# Patient Record
Sex: FEMALE | Race: WHITE | NOT HISPANIC OR LATINO | Employment: OTHER | URBAN - METROPOLITAN AREA
[De-identification: names, ages, dates, MRNs, and addresses within clinical notes are randomized per-mention and may not be internally consistent; named-entity substitution may affect disease eponyms.]

---

## 2017-09-15 ENCOUNTER — TRANSFERRED RECORDS (OUTPATIENT)
Dept: HEALTH INFORMATION MANAGEMENT | Facility: CLINIC | Age: 67
End: 2017-09-15

## 2017-09-15 ENCOUNTER — HOSPITAL ENCOUNTER (OUTPATIENT)
Dept: MAMMOGRAPHY | Facility: CLINIC | Age: 67
Discharge: HOME | End: 2017-09-15
Attending: PHYSICIAN ASSISTANT | Admitting: PHYSICIAN ASSISTANT
Payer: COMMERCIAL

## 2017-09-15 DIAGNOSIS — Z12.31 VISIT FOR SCREENING MAMMOGRAM: ICD-10-CM

## 2017-09-15 PROCEDURE — G0202 SCR MAMMO BI INCL CAD: HCPCS

## 2017-09-19 RX ORDER — PRENATAL VIT/IRON FUM/FOLIC AC 27MG-0.8MG
1 TABLET ORAL DAILY
Status: ON HOLD | COMMUNITY
End: 2019-07-02

## 2017-09-19 RX ORDER — FLUTICASONE PROPIONATE AND SALMETEROL XINAFOATE 45; 21 UG/1; UG/1
1 AEROSOL, METERED RESPIRATORY (INHALATION) DAILY PRN
Status: ON HOLD | COMMUNITY
End: 2019-07-02

## 2017-09-20 ENCOUNTER — APPOINTMENT (OUTPATIENT)
Dept: GENERAL RADIOLOGY | Facility: CLINIC | Age: 67
DRG: 470 | End: 2017-09-20
Attending: ORTHOPAEDIC SURGERY
Payer: COMMERCIAL

## 2017-09-20 ENCOUNTER — ANESTHESIA EVENT (OUTPATIENT)
Dept: SURGERY | Facility: CLINIC | Age: 67
DRG: 470 | End: 2017-09-20
Payer: COMMERCIAL

## 2017-09-20 ENCOUNTER — ANESTHESIA (OUTPATIENT)
Dept: SURGERY | Facility: CLINIC | Age: 67
DRG: 470 | End: 2017-09-20
Payer: COMMERCIAL

## 2017-09-20 ENCOUNTER — HOSPITAL ENCOUNTER (INPATIENT)
Facility: CLINIC | Age: 67
LOS: 3 days | Discharge: HOME | DRG: 470 | End: 2017-09-23
Attending: ORTHOPAEDIC SURGERY | Admitting: ORTHOPAEDIC SURGERY
Payer: COMMERCIAL

## 2017-09-20 DIAGNOSIS — Z96.641 H/O TOTAL HIP ARTHROPLASTY, RIGHT: Primary | ICD-10-CM

## 2017-09-20 LAB
ABO + RH BLD: NORMAL
ABO + RH BLD: NORMAL
BLD GP AB SCN SERPL QL: NORMAL
BLOOD BANK CMNT PATIENT-IMP: NORMAL
CREAT SERPL-MCNC: 0.88 MG/DL (ref 0.52–1.04)
ERYTHROCYTE [DISTWIDTH] IN BLOOD BY AUTOMATED COUNT: 13.8 % (ref 10–15)
GFR SERPL CREATININE-BSD FRML MDRD: 64 ML/MIN/1.7M2
HCT VFR BLD AUTO: 38.8 % (ref 35–47)
HGB BLD-MCNC: 13 G/DL (ref 11.7–15.7)
INR PPP: 0.91 (ref 0.86–1.14)
MCH RBC QN AUTO: 30.5 PG (ref 26.5–33)
MCHC RBC AUTO-ENTMCNC: 33.5 G/DL (ref 31.5–36.5)
MCV RBC AUTO: 91 FL (ref 78–100)
PLATELET # BLD AUTO: 264 10E9/L (ref 150–450)
POTASSIUM SERPL-SCNC: 4.2 MMOL/L (ref 3.4–5.3)
RBC # BLD AUTO: 4.26 10E12/L (ref 3.8–5.2)
SPECIMEN EXP DATE BLD: NORMAL
WBC # BLD AUTO: 5.1 10E9/L (ref 4–11)

## 2017-09-20 PROCEDURE — 82565 ASSAY OF CREATININE: CPT | Performed by: ORTHOPAEDIC SURGERY

## 2017-09-20 PROCEDURE — 27210794 ZZH OR GENERAL SUPPLY STERILE: Performed by: ORTHOPAEDIC SURGERY

## 2017-09-20 PROCEDURE — 27210995 ZZH RX 272: Performed by: ORTHOPAEDIC SURGERY

## 2017-09-20 PROCEDURE — 86900 BLOOD TYPING SEROLOGIC ABO: CPT | Performed by: ORTHOPAEDIC SURGERY

## 2017-09-20 PROCEDURE — 86901 BLOOD TYPING SEROLOGIC RH(D): CPT | Performed by: ORTHOPAEDIC SURGERY

## 2017-09-20 PROCEDURE — 25000128 H RX IP 250 OP 636: Performed by: NURSE ANESTHETIST, CERTIFIED REGISTERED

## 2017-09-20 PROCEDURE — 36000093 ZZH SURGERY LEVEL 4 1ST 30 MIN: Performed by: ORTHOPAEDIC SURGERY

## 2017-09-20 PROCEDURE — 25000125 ZZHC RX 250: Performed by: NURSE ANESTHETIST, CERTIFIED REGISTERED

## 2017-09-20 PROCEDURE — 84132 ASSAY OF SERUM POTASSIUM: CPT | Performed by: ORTHOPAEDIC SURGERY

## 2017-09-20 PROCEDURE — A9270 NON-COVERED ITEM OR SERVICE: HCPCS | Mod: GY | Performed by: ORTHOPAEDIC SURGERY

## 2017-09-20 PROCEDURE — 25000128 H RX IP 250 OP 636: Performed by: ORTHOPAEDIC SURGERY

## 2017-09-20 PROCEDURE — 37000008 ZZH ANESTHESIA TECHNICAL FEE, 1ST 30 MIN: Performed by: ORTHOPAEDIC SURGERY

## 2017-09-20 PROCEDURE — 85027 COMPLETE CBC AUTOMATED: CPT | Performed by: ORTHOPAEDIC SURGERY

## 2017-09-20 PROCEDURE — 71000014 ZZH RECOVERY PHASE 1 LEVEL 2 FIRST HR: Performed by: ORTHOPAEDIC SURGERY

## 2017-09-20 PROCEDURE — 0SR901A REPLACEMENT OF RIGHT HIP JOINT WITH METAL SYNTHETIC SUBSTITUTE, UNCEMENTED, OPEN APPROACH: ICD-10-PCS | Performed by: ORTHOPAEDIC SURGERY

## 2017-09-20 PROCEDURE — 71000015 ZZH RECOVERY PHASE 1 LEVEL 2 EA ADDTL HR: Performed by: ORTHOPAEDIC SURGERY

## 2017-09-20 PROCEDURE — 40000986 XR PELVIS PORT 1/2 VW

## 2017-09-20 PROCEDURE — S0020 INJECTION, BUPIVICAINE HYDRO: HCPCS | Performed by: ORTHOPAEDIC SURGERY

## 2017-09-20 PROCEDURE — 85610 PROTHROMBIN TIME: CPT | Performed by: ORTHOPAEDIC SURGERY

## 2017-09-20 PROCEDURE — 25000128 H RX IP 250 OP 636: Performed by: ANESTHESIOLOGY

## 2017-09-20 PROCEDURE — 25000566 ZZH SEVOFLURANE, EA 15 MIN: Performed by: ORTHOPAEDIC SURGERY

## 2017-09-20 PROCEDURE — 40000170 ZZH STATISTIC PRE-PROCEDURE ASSESSMENT II: Performed by: ORTHOPAEDIC SURGERY

## 2017-09-20 PROCEDURE — 36415 COLL VENOUS BLD VENIPUNCTURE: CPT | Performed by: ORTHOPAEDIC SURGERY

## 2017-09-20 PROCEDURE — C1713 ANCHOR/SCREW BN/BN,TIS/BN: HCPCS | Performed by: ORTHOPAEDIC SURGERY

## 2017-09-20 PROCEDURE — 25000125 ZZHC RX 250: Performed by: ORTHOPAEDIC SURGERY

## 2017-09-20 PROCEDURE — 25000132 ZZH RX MED GY IP 250 OP 250 PS 637: Mod: GY | Performed by: ORTHOPAEDIC SURGERY

## 2017-09-20 PROCEDURE — 37000009 ZZH ANESTHESIA TECHNICAL FEE, EACH ADDTL 15 MIN: Performed by: ORTHOPAEDIC SURGERY

## 2017-09-20 PROCEDURE — 25000125 ZZHC RX 250: Performed by: ANESTHESIOLOGY

## 2017-09-20 PROCEDURE — 86850 RBC ANTIBODY SCREEN: CPT | Performed by: ORTHOPAEDIC SURGERY

## 2017-09-20 PROCEDURE — 12000007 ZZH R&B INTERMEDIATE

## 2017-09-20 PROCEDURE — 36000063 ZZH SURGERY LEVEL 4 EA 15 ADDTL MIN: Performed by: ORTHOPAEDIC SURGERY

## 2017-09-20 PROCEDURE — C1776 JOINT DEVICE (IMPLANTABLE): HCPCS | Performed by: ORTHOPAEDIC SURGERY

## 2017-09-20 DEVICE — IMP SCR BONE CAN ACE 6.5X35MM 1217-35-500: Type: IMPLANTABLE DEVICE | Site: HIP | Status: FUNCTIONAL

## 2017-09-20 DEVICE — IMPLANTABLE DEVICE: Type: IMPLANTABLE DEVICE | Site: HIP | Status: FUNCTIONAL

## 2017-09-20 DEVICE — IMP CUP ACE PINNACLE 56MM 1217-22-056: Type: IMPLANTABLE DEVICE | Site: HIP | Status: FUNCTIONAL

## 2017-09-20 DEVICE — IMP LINER HIP DEPUY PINNACLE ALTRX 36X56MM +4 1221-36-456: Type: IMPLANTABLE DEVICE | Site: HIP | Status: FUNCTIONAL

## 2017-09-20 DEVICE — IMP HEAD FEMORAL DEPUY 36MM +1.5 1365-51-000: Type: IMPLANTABLE DEVICE | Site: HIP | Status: FUNCTIONAL

## 2017-09-20 DEVICE — IMP SCR BONE CAN ACE 6.5X30MM 1217-30-500: Type: IMPLANTABLE DEVICE | Site: HIP | Status: FUNCTIONAL

## 2017-09-20 RX ORDER — LIDOCAINE 40 MG/G
CREAM TOPICAL
Status: DISCONTINUED | OUTPATIENT
Start: 2017-09-20 | End: 2017-09-23 | Stop reason: HOSPADM

## 2017-09-20 RX ORDER — ACETAMINOPHEN 325 MG/1
650 TABLET ORAL EVERY 4 HOURS PRN
Status: DISCONTINUED | OUTPATIENT
Start: 2017-09-23 | End: 2017-09-23 | Stop reason: HOSPADM

## 2017-09-20 RX ORDER — EPHEDRINE SULFATE 50 MG/ML
INJECTION, SOLUTION INTRAMUSCULAR; INTRAVENOUS; SUBCUTANEOUS PRN
Status: DISCONTINUED | OUTPATIENT
Start: 2017-09-20 | End: 2017-09-20

## 2017-09-20 RX ORDER — BUPIVACAINE HYDROCHLORIDE AND EPINEPHRINE 5; 5 MG/ML; UG/ML
INJECTION, SOLUTION PERINEURAL PRN
Status: DISCONTINUED | OUTPATIENT
Start: 2017-09-20 | End: 2017-09-20 | Stop reason: HOSPADM

## 2017-09-20 RX ORDER — HYDROCHLOROTHIAZIDE 12.5 MG/1
12.5 CAPSULE ORAL DAILY PRN
Status: DISCONTINUED | OUTPATIENT
Start: 2017-09-20 | End: 2017-09-23 | Stop reason: HOSPADM

## 2017-09-20 RX ORDER — VANCOMYCIN HYDROCHLORIDE 1 G/200ML
1000 INJECTION, SOLUTION INTRAVENOUS
Status: COMPLETED | OUTPATIENT
Start: 2017-09-20 | End: 2017-09-20

## 2017-09-20 RX ORDER — PRENATAL VIT/IRON FUM/FOLIC AC 27MG-0.8MG
1 TABLET ORAL DAILY
Status: DISCONTINUED | OUTPATIENT
Start: 2017-09-20 | End: 2017-09-23 | Stop reason: HOSPADM

## 2017-09-20 RX ORDER — ACETAMINOPHEN 325 MG/1
975 TABLET ORAL EVERY 8 HOURS
Status: DISCONTINUED | OUTPATIENT
Start: 2017-09-20 | End: 2017-09-23 | Stop reason: HOSPADM

## 2017-09-20 RX ORDER — OXYCODONE HYDROCHLORIDE 5 MG/1
5-10 TABLET ORAL EVERY 4 HOURS PRN
Status: DISCONTINUED | OUTPATIENT
Start: 2017-09-20 | End: 2017-09-23 | Stop reason: HOSPADM

## 2017-09-20 RX ORDER — CEFAZOLIN SODIUM 2 G/100ML
2 INJECTION, SOLUTION INTRAVENOUS
Status: DISCONTINUED | OUTPATIENT
Start: 2017-09-20 | End: 2017-09-20 | Stop reason: ALTCHOICE

## 2017-09-20 RX ORDER — FERROUS GLUCONATE 324(38)MG
324 TABLET ORAL DAILY
Status: DISCONTINUED | OUTPATIENT
Start: 2017-09-20 | End: 2017-09-23 | Stop reason: HOSPADM

## 2017-09-20 RX ORDER — GLYCOPYRROLATE 0.2 MG/ML
INJECTION, SOLUTION INTRAMUSCULAR; INTRAVENOUS PRN
Status: DISCONTINUED | OUTPATIENT
Start: 2017-09-20 | End: 2017-09-20

## 2017-09-20 RX ORDER — LABETALOL HYDROCHLORIDE 5 MG/ML
10 INJECTION, SOLUTION INTRAVENOUS ONCE
Status: COMPLETED | OUTPATIENT
Start: 2017-09-20 | End: 2017-09-20

## 2017-09-20 RX ORDER — SODIUM CHLORIDE 9 MG/ML
INJECTION, SOLUTION INTRAVENOUS CONTINUOUS
Status: DISCONTINUED | OUTPATIENT
Start: 2017-09-20 | End: 2017-09-22 | Stop reason: CLARIF

## 2017-09-20 RX ORDER — OXYCODONE HCL 10 MG/1
10 TABLET, FILM COATED, EXTENDED RELEASE ORAL ONCE
Status: COMPLETED | OUTPATIENT
Start: 2017-09-20 | End: 2017-09-20

## 2017-09-20 RX ORDER — KETOROLAC TROMETHAMINE 15 MG/ML
15 INJECTION, SOLUTION INTRAMUSCULAR; INTRAVENOUS EVERY 6 HOURS PRN
Status: COMPLETED | OUTPATIENT
Start: 2017-09-20 | End: 2017-09-21

## 2017-09-20 RX ORDER — PROCHLORPERAZINE MALEATE 5 MG
5 TABLET ORAL EVERY 6 HOURS PRN
Status: DISCONTINUED | OUTPATIENT
Start: 2017-09-20 | End: 2017-09-23 | Stop reason: HOSPADM

## 2017-09-20 RX ORDER — NEOSTIGMINE METHYLSULFATE 1 MG/ML
VIAL (ML) INJECTION PRN
Status: DISCONTINUED | OUTPATIENT
Start: 2017-09-20 | End: 2017-09-20

## 2017-09-20 RX ORDER — HYDROXYZINE HYDROCHLORIDE 10 MG/1
10 TABLET, FILM COATED ORAL EVERY 6 HOURS PRN
Status: DISCONTINUED | OUTPATIENT
Start: 2017-09-20 | End: 2017-09-23 | Stop reason: HOSPADM

## 2017-09-20 RX ORDER — HYDRALAZINE HYDROCHLORIDE 20 MG/ML
10 INJECTION INTRAMUSCULAR; INTRAVENOUS ONCE
Status: COMPLETED | OUTPATIENT
Start: 2017-09-20 | End: 2017-09-20

## 2017-09-20 RX ORDER — LABETALOL HYDROCHLORIDE 5 MG/ML
INJECTION, SOLUTION INTRAVENOUS PRN
Status: DISCONTINUED | OUTPATIENT
Start: 2017-09-20 | End: 2017-09-20

## 2017-09-20 RX ORDER — ONDANSETRON 2 MG/ML
4 INJECTION INTRAMUSCULAR; INTRAVENOUS EVERY 30 MIN PRN
Status: DISCONTINUED | OUTPATIENT
Start: 2017-09-20 | End: 2017-09-20 | Stop reason: HOSPADM

## 2017-09-20 RX ORDER — LEVOTHYROXINE SODIUM 100 UG/1
100 TABLET ORAL DAILY
Status: DISCONTINUED | OUTPATIENT
Start: 2017-09-21 | End: 2017-09-23 | Stop reason: HOSPADM

## 2017-09-20 RX ORDER — FENTANYL CITRATE 50 UG/ML
25-50 INJECTION, SOLUTION INTRAMUSCULAR; INTRAVENOUS
Status: DISCONTINUED | OUTPATIENT
Start: 2017-09-20 | End: 2017-09-20 | Stop reason: HOSPADM

## 2017-09-20 RX ORDER — PROPOFOL 10 MG/ML
INJECTION, EMULSION INTRAVENOUS PRN
Status: DISCONTINUED | OUTPATIENT
Start: 2017-09-20 | End: 2017-09-20

## 2017-09-20 RX ORDER — FENTANYL CITRATE 50 UG/ML
INJECTION, SOLUTION INTRAMUSCULAR; INTRAVENOUS PRN
Status: DISCONTINUED | OUTPATIENT
Start: 2017-09-20 | End: 2017-09-20

## 2017-09-20 RX ORDER — CEFAZOLIN SODIUM 1 G/3ML
1 INJECTION, POWDER, FOR SOLUTION INTRAMUSCULAR; INTRAVENOUS SEE ADMIN INSTRUCTIONS
Status: DISCONTINUED | OUTPATIENT
Start: 2017-09-20 | End: 2017-09-20 | Stop reason: ALTCHOICE

## 2017-09-20 RX ORDER — SODIUM CHLORIDE, SODIUM LACTATE, POTASSIUM CHLORIDE, CALCIUM CHLORIDE 600; 310; 30; 20 MG/100ML; MG/100ML; MG/100ML; MG/100ML
INJECTION, SOLUTION INTRAVENOUS CONTINUOUS
Status: DISCONTINUED | OUTPATIENT
Start: 2017-09-20 | End: 2017-09-20 | Stop reason: HOSPADM

## 2017-09-20 RX ORDER — HYDROMORPHONE HYDROCHLORIDE 1 MG/ML
.3-.5 INJECTION, SOLUTION INTRAMUSCULAR; INTRAVENOUS; SUBCUTANEOUS
Status: DISCONTINUED | OUTPATIENT
Start: 2017-09-20 | End: 2017-09-23 | Stop reason: HOSPADM

## 2017-09-20 RX ORDER — LIDOCAINE HYDROCHLORIDE 20 MG/ML
INJECTION, SOLUTION INFILTRATION; PERINEURAL PRN
Status: DISCONTINUED | OUTPATIENT
Start: 2017-09-20 | End: 2017-09-20

## 2017-09-20 RX ORDER — ALBUTEROL SULFATE 90 UG/1
1-2 AEROSOL, METERED RESPIRATORY (INHALATION) EVERY 4 HOURS PRN
Status: DISCONTINUED | OUTPATIENT
Start: 2017-09-20 | End: 2017-09-23 | Stop reason: HOSPADM

## 2017-09-20 RX ORDER — VANCOMYCIN HYDROCHLORIDE 1 G/200ML
1000 INJECTION, SOLUTION INTRAVENOUS SEE ADMIN INSTRUCTIONS
Status: DISCONTINUED | OUTPATIENT
Start: 2017-09-20 | End: 2017-09-20 | Stop reason: HOSPADM

## 2017-09-20 RX ORDER — VANCOMYCIN HYDROCHLORIDE 1 G/20ML
INJECTION, POWDER, LYOPHILIZED, FOR SOLUTION INTRAVENOUS PRN
Status: DISCONTINUED | OUTPATIENT
Start: 2017-09-20 | End: 2017-09-20 | Stop reason: HOSPADM

## 2017-09-20 RX ORDER — ONDANSETRON 4 MG/1
4 TABLET, ORALLY DISINTEGRATING ORAL EVERY 6 HOURS PRN
Status: DISCONTINUED | OUTPATIENT
Start: 2017-09-20 | End: 2017-09-23 | Stop reason: HOSPADM

## 2017-09-20 RX ORDER — HYDROMORPHONE HYDROCHLORIDE 1 MG/ML
.3-.5 INJECTION, SOLUTION INTRAMUSCULAR; INTRAVENOUS; SUBCUTANEOUS EVERY 5 MIN PRN
Status: DISCONTINUED | OUTPATIENT
Start: 2017-09-20 | End: 2017-09-20 | Stop reason: HOSPADM

## 2017-09-20 RX ORDER — ONDANSETRON 2 MG/ML
INJECTION INTRAMUSCULAR; INTRAVENOUS PRN
Status: DISCONTINUED | OUTPATIENT
Start: 2017-09-20 | End: 2017-09-20

## 2017-09-20 RX ORDER — ONDANSETRON 4 MG/1
4 TABLET, ORALLY DISINTEGRATING ORAL EVERY 30 MIN PRN
Status: DISCONTINUED | OUTPATIENT
Start: 2017-09-20 | End: 2017-09-20 | Stop reason: HOSPADM

## 2017-09-20 RX ORDER — BUPIVACAINE HYDROCHLORIDE 5 MG/ML
INJECTION, SOLUTION PERINEURAL PRN
Status: DISCONTINUED | OUTPATIENT
Start: 2017-09-20 | End: 2017-09-20 | Stop reason: HOSPADM

## 2017-09-20 RX ORDER — AMOXICILLIN 250 MG
1-2 CAPSULE ORAL 2 TIMES DAILY
Status: DISCONTINUED | OUTPATIENT
Start: 2017-09-20 | End: 2017-09-23 | Stop reason: HOSPADM

## 2017-09-20 RX ORDER — ONDANSETRON 2 MG/ML
4 INJECTION INTRAMUSCULAR; INTRAVENOUS EVERY 6 HOURS PRN
Status: DISCONTINUED | OUTPATIENT
Start: 2017-09-20 | End: 2017-09-23 | Stop reason: HOSPADM

## 2017-09-20 RX ORDER — LISINOPRIL 40 MG/1
40 TABLET ORAL DAILY
Status: DISCONTINUED | OUTPATIENT
Start: 2017-09-21 | End: 2017-09-23 | Stop reason: HOSPADM

## 2017-09-20 RX ORDER — NALOXONE HYDROCHLORIDE 0.4 MG/ML
.1-.4 INJECTION, SOLUTION INTRAMUSCULAR; INTRAVENOUS; SUBCUTANEOUS
Status: DISCONTINUED | OUTPATIENT
Start: 2017-09-20 | End: 2017-09-23 | Stop reason: HOSPADM

## 2017-09-20 RX ORDER — CYCLOBENZAPRINE HCL 5 MG
5 TABLET ORAL 3 TIMES DAILY PRN
Status: DISCONTINUED | OUTPATIENT
Start: 2017-09-20 | End: 2017-09-23 | Stop reason: HOSPADM

## 2017-09-20 RX ADMIN — LIDOCAINE HYDROCHLORIDE 0.2 ML: 10 INJECTION, SOLUTION EPIDURAL; INFILTRATION; INTRACAUDAL; PERINEURAL at 08:35

## 2017-09-20 RX ADMIN — PROPOFOL 200 MG: 10 INJECTION, EMULSION INTRAVENOUS at 10:54

## 2017-09-20 RX ADMIN — LABETALOL HYDROCHLORIDE 10 MG: 5 INJECTION, SOLUTION INTRAVENOUS at 13:27

## 2017-09-20 RX ADMIN — KETOROLAC TROMETHAMINE 15 MG: 15 INJECTION, SOLUTION INTRAMUSCULAR; INTRAVENOUS at 23:49

## 2017-09-20 RX ADMIN — ONDANSETRON 4 MG: 2 INJECTION INTRAMUSCULAR; INTRAVENOUS at 12:29

## 2017-09-20 RX ADMIN — KETOROLAC TROMETHAMINE 15 MG: 15 INJECTION, SOLUTION INTRAMUSCULAR; INTRAVENOUS at 16:32

## 2017-09-20 RX ADMIN — LIDOCAINE HYDROCHLORIDE 100 MG: 20 INJECTION, SOLUTION INFILTRATION; PERINEURAL at 10:54

## 2017-09-20 RX ADMIN — SODIUM CHLORIDE, POTASSIUM CHLORIDE, SODIUM LACTATE AND CALCIUM CHLORIDE: 600; 310; 30; 20 INJECTION, SOLUTION INTRAVENOUS at 11:45

## 2017-09-20 RX ADMIN — NEOSTIGMINE METHYLSULFATE 4 MG: 1 INJECTION INTRAMUSCULAR; INTRAVENOUS; SUBCUTANEOUS at 12:32

## 2017-09-20 RX ADMIN — PROCHLORPERAZINE EDISYLATE 5 MG: 5 INJECTION INTRAMUSCULAR; INTRAVENOUS at 21:04

## 2017-09-20 RX ADMIN — HYDROMORPHONE HYDROCHLORIDE 0.5 MG: 1 INJECTION, SOLUTION INTRAMUSCULAR; INTRAVENOUS; SUBCUTANEOUS at 11:33

## 2017-09-20 RX ADMIN — HYDRALAZINE HYDROCHLORIDE 10 MG: 20 INJECTION INTRAMUSCULAR; INTRAVENOUS at 14:29

## 2017-09-20 RX ADMIN — FENTANYL CITRATE 50 MCG: 50 INJECTION, SOLUTION INTRAMUSCULAR; INTRAVENOUS at 13:31

## 2017-09-20 RX ADMIN — Medication 5 MG: at 12:03

## 2017-09-20 RX ADMIN — SODIUM CHLORIDE, POTASSIUM CHLORIDE, SODIUM LACTATE AND CALCIUM CHLORIDE: 600; 310; 30; 20 INJECTION, SOLUTION INTRAVENOUS at 12:37

## 2017-09-20 RX ADMIN — PROCHLORPERAZINE EDISYLATE 5 MG: 5 INJECTION INTRAMUSCULAR; INTRAVENOUS at 15:23

## 2017-09-20 RX ADMIN — Medication 10 MG: at 12:12

## 2017-09-20 RX ADMIN — HYDRALAZINE HYDROCHLORIDE 10 MG: 20 INJECTION INTRAMUSCULAR; INTRAVENOUS at 14:02

## 2017-09-20 RX ADMIN — ROCURONIUM BROMIDE 50 MG: 10 INJECTION INTRAVENOUS at 10:54

## 2017-09-20 RX ADMIN — ONDANSETRON 4 MG: 2 SOLUTION INTRAMUSCULAR; INTRAVENOUS at 16:58

## 2017-09-20 RX ADMIN — SODIUM CHLORIDE, POTASSIUM CHLORIDE, SODIUM LACTATE AND CALCIUM CHLORIDE: 600; 310; 30; 20 INJECTION, SOLUTION INTRAVENOUS at 08:35

## 2017-09-20 RX ADMIN — ONDANSETRON 4 MG: 2 SOLUTION INTRAMUSCULAR; INTRAVENOUS at 13:46

## 2017-09-20 RX ADMIN — VANCOMYCIN HYDROCHLORIDE 1500 MG: 5 INJECTION, POWDER, LYOPHILIZED, FOR SOLUTION INTRAVENOUS at 21:51

## 2017-09-20 RX ADMIN — HYDROMORPHONE HYDROCHLORIDE 0.5 MG: 1 INJECTION, SOLUTION INTRAMUSCULAR; INTRAVENOUS; SUBCUTANEOUS at 14:09

## 2017-09-20 RX ADMIN — Medication 5 MG: at 12:17

## 2017-09-20 RX ADMIN — SODIUM CHLORIDE: 9 INJECTION, SOLUTION INTRAVENOUS at 16:32

## 2017-09-20 RX ADMIN — VANCOMYCIN HYDROCHLORIDE 1000 MG: 1 INJECTION, SOLUTION INTRAVENOUS at 10:37

## 2017-09-20 RX ADMIN — MIDAZOLAM HYDROCHLORIDE 2 MG: 1 INJECTION, SOLUTION INTRAMUSCULAR; INTRAVENOUS at 10:45

## 2017-09-20 RX ADMIN — ONDANSETRON 4 MG: 2 SOLUTION INTRAMUSCULAR; INTRAVENOUS at 23:46

## 2017-09-20 RX ADMIN — Medication 5 MG: at 12:15

## 2017-09-20 RX ADMIN — HYDROMORPHONE HYDROCHLORIDE 1 MG: 1 INJECTION, SOLUTION INTRAMUSCULAR; INTRAVENOUS; SUBCUTANEOUS at 11:02

## 2017-09-20 RX ADMIN — HYDROMORPHONE HYDROCHLORIDE 0.5 MG: 1 INJECTION, SOLUTION INTRAMUSCULAR; INTRAVENOUS; SUBCUTANEOUS at 13:14

## 2017-09-20 RX ADMIN — Medication 10 MG: at 11:42

## 2017-09-20 RX ADMIN — OXYCODONE HYDROCHLORIDE 10 MG: 10 TABLET, FILM COATED, EXTENDED RELEASE ORAL at 08:18

## 2017-09-20 RX ADMIN — FENTANYL CITRATE 100 MCG: 50 INJECTION, SOLUTION INTRAMUSCULAR; INTRAVENOUS at 10:54

## 2017-09-20 RX ADMIN — GLYCOPYRROLATE 0.6 MG: 0.2 INJECTION, SOLUTION INTRAMUSCULAR; INTRAVENOUS at 12:32

## 2017-09-20 RX ADMIN — LABETALOL HYDROCHLORIDE 10 MG: 5 INJECTION, SOLUTION INTRAVENOUS at 11:06

## 2017-09-20 ASSESSMENT — LIFESTYLE VARIABLES: TOBACCO_USE: 1

## 2017-09-20 NOTE — PROVIDER NOTIFICATION
No change to BP after 10mg hydralazine.  Dr Jesus ordered another 10mg Hydalazine to be given.  Will continue to monitor.

## 2017-09-20 NOTE — ANESTHESIA CARE TRANSFER NOTE
Patient: Jayshree Hastings    Procedure(s):  RIGHT TOTAL HIP ARTHROPLASTY  - Wound Class: I-Clean    Diagnosis: djd  Diagnosis Additional Information: No value filed.    Anesthesia Type:   General, ETT     Note:  Airway :Face Mask  Patient transferred to:PACU  Comments: Patient breathing spontaneously.  Follows commands.  Suctioned and extubated.  Exchanging air well.  Transferred to PACU with 10L O2 via mask.  Monitors on.  VSS.  Patent IV.  Report and transfer of care to RN.        Vitals: (Last set prior to Anesthesia Care Transfer)    CRNA VITALS  9/20/2017 1216 - 9/20/2017 1254      9/20/2017             Pulse: 111    SpO2: 98 %    Resp Rate (observed): 12                Electronically Signed By: MAURICE Terrell CRNA  September 20, 2017  12:54 PM

## 2017-09-20 NOTE — ANESTHESIA POSTPROCEDURE EVALUATION
Patient: Jayshree Hastings    Procedure(s):  RIGHT TOTAL HIP ARTHROPLASTY  - Wound Class: I-Clean    Diagnosis:djd  Diagnosis Additional Information: No value filed.    Anesthesia Type:  General, ETT    Note:  Anesthesia Post Evaluation    Patient location during evaluation: PACU  Patient participation: Able to fully participate in evaluation  Level of consciousness: awake and alert  Pain management: adequate  Airway patency: patent  Cardiovascular status: acceptable  Respiratory status: acceptable  Hydration status: acceptable  PONV: none and controlled     Anesthetic complications: None          Last vitals:  Vitals:    09/20/17 1528 09/20/17 1530 09/20/17 1550   BP:   152/90   Resp:  11 12   Temp:   36.4  C (97.5  F)   SpO2: 97% 97% 93%         Electronically Signed By: Alexander Jesus MD  September 20, 2017  4:11 PM

## 2017-09-20 NOTE — PROVIDER NOTIFICATION
Dr Jesus notified regarding PACU BP's ranging from 170-190's/100's.  Labile pressure in OR.  Verbal order for 10mg labetalol. Will continue to monitor.

## 2017-09-20 NOTE — IP AVS SNAPSHOT
[address] [facility]    [address] [place] [address]    Phone:  [phone]                                       After Visit Summary   9/20/2017    Jayshree Hastings    MRN: [id number]           After Visit Summary Signature Page     I have received my discharge instructions, and my questions have been answered. I have discussed any challenges I see with this plan with the nurse or doctor.    ..........................................................................................................................................  Patient/Patient Representative Signature      ..........................................................................................................................................  Patient Representative Print Name and Relationship to Patient    ..................................................               ................................................  Date                                            Time    ..........................................................................................................................................  Reviewed by Signature/Title    ...................................................              ..............................................  Date                                                            Time

## 2017-09-20 NOTE — ANESTHESIA PREPROCEDURE EVALUATION
Procedure: Procedure(s):  ARTHROPLASTY HIP  Preop diagnosis: djd    No Known Allergies  Past Medical History:   Diagnosis Date     Allergic rhinitis      Asthma, mild intermittent      Hypertension      Hypothyroid      Past Surgical History:   Procedure Laterality Date     wisdom teeth       Prior to Admission medications    Medication Sig Start Date End Date Taking? Authorizing Provider   IBUPROFEN PO Take 200 mg by mouth daily as needed for moderate pain   Yes Reported, Patient   ASPIRIN PO Take 81 mg by mouth daily   Yes Reported, Patient   Albuterol Sulfate (PROAIR HFA IN) Inhale 1-2 puffs into the lungs every 4 hours as needed (chest tightness/wheezing)   Yes Reported, Patient   HYDROCHLOROTHIAZIDE PO Take 12.5 mg by mouth daily as needed    Yes Reported, Patient   LISINOPRIL PO Take 40 mg by mouth daily   Yes Reported, Patient   LEVOTHYROXINE SODIUM PO Take 100 mcg by mouth daily   Yes Reported, Patient   fluticasone-salmeterol (ADVAIR HFA) 45-21 MCG/ACT inhaler Inhale 1 puff into the lungs daily as needed    Yes Reported, Patient   Prenatal Vit-Fe Fumarate-FA (PRENATAL MULTIVITAMIN PLUS IRON) 27-0.8 MG TABS per tablet Take 1 tablet by mouth daily   Yes Reported, Patient   VITAMIN D, CHOLECALCIFEROL, PO Take 1,000 Units by mouth daily   Yes Reported, Patient     Current Facility-Administered Medications Ordered in Epic   Medication Dose Route Frequency Last Rate Last Dose     ceFAZolin sodium-dextrose (ANCEF) infusion 2 g  2 g Intravenous Pre-Op/Pre-procedure x 1 dose         ceFAZolin (ANCEF) 1 g vial to attach to  ml bag for ADULT or 50 ml bag for PEDS  1 g Intravenous See Admin Instructions         vancomycin (VANCOCIN) 1000 mg in dextrose 5% 200 mL PREMIX  1,000 mg Intravenous Pre-Op/Pre-procedure x 1 dose         vancomycin (VANCOCIN) 1000 mg in dextrose 5% 200 mL PREMIX  1,000 mg Intravenous See Admin Instructions         tranexamic acid (CYKLOKAPRON) 2 g in NaCl 0.9 % 30 mL intra-articular  solution  2 g INTRA-ARTICULAR Once         lidocaine 1 % 1 mL  1 mL Other Q1H PRN   0.2 mL at 09/20/17 0835     lactated ringers infusion   Intravenous Continuous 25 mL/hr at 09/20/17 0835       No current Epic-ordered outpatient prescriptions on file.     Wt Readings from Last 1 Encounters:   09/20/17 86.2 kg (190 lb)     Temp Readings from Last 1 Encounters:   09/20/17 36.1  C (97  F) (Temporal)     BP Readings from Last 6 Encounters:   09/20/17 144/86     Pulse Readings from Last 4 Encounters:   No data found for Pulse     Resp Readings from Last 1 Encounters:   09/20/17 16     SpO2 Readings from Last 1 Encounters:   09/20/17 97%     No results for input(s): NA, POTASSIUM, CHLORIDE, CO2, ANIONGAP, GLC, BUN, CR, CE in the last 46204 hours.  No results for input(s): AST, ALT in the last 30275 hours.    Invalid input(s): ALP, BILT, LPSE  Recent Labs   Lab Test  09/20/17   0830   WBC  5.1   HGB  13.0   PLT  264     No results for input(s): INR in the last 26379 hours.    Invalid input(s): APTT   No results for input(s): TROPI in the last 58402 hours.  RECENT LABS:   ECG:   ECHO:   CXR:    Anesthesia Evaluation     . Pt has not had prior anesthetic            ROS/MED HX    ENT/Pulmonary:     (+)tobacco use, Current use 1/4 packs/day  Intermittent asthma Treatment: Inhaler prn,  , . .   (-) sleep apnea   Neurologic:       Cardiovascular:     (+) hypertension----. : . . . :. .       METS/Exercise Tolerance:     Hematologic:         Musculoskeletal:   (+) arthritis, , , -       GI/Hepatic:         Renal/Genitourinary:         Endo:     (+) thyroid problem .      Psychiatric:         Infectious Disease:         Malignancy:         Other:                     Physical Exam  Normal systems: cardiovascular and pulmonary    Airway   Mallampati: I  Neck ROM: full    Dental   (+) caps and missing    Cardiovascular       Pulmonary                     Anesthesia Plan      History & Physical Review  History and physical reviewed  and following examination; no interval change.    ASA Status:  2 .    NPO Status:  > 8 hours    Plan for General and ETT with Intravenous induction. Maintenance will be Balanced.    PONV prophylaxis:  Ondansetron (or other 5HT-3)  Additional equipment: Videolaryngoscope      Postoperative Care  Postoperative pain management:  IV analgesics.      Consents  Anesthetic plan, risks, benefits and alternatives discussed with:  Patient and Spouse..                          .

## 2017-09-20 NOTE — IP AVS SNAPSHOT
MRN:[id number]                      After Visit Summary   9/20/2017    Jayshree Hastings    MRN: [id number]           Thank you!     Thank you for choosing [facility] for your care. Our goal is always to provide you with excellent care. Hearing back from our patients is one way we can continue to improve our services. Please take a few minutes to complete the written survey that you may receive in the mail after you visit with us. Thank you!        Patient Information     Date Of Birth          1950        Designated Caregiver       Most Recent Value    Caregiver    Will someone help with your care after discharge? no [pt primary-  help prn]      About your hospital stay     You were admitted on:  September 20, 2017 You last received care in the:  [place] [facility] [address] [facility]    You were discharged on:  September 23, 2017        Reason for your hospital stay       darell                  Who to Call     For medical emergencies, please call 911.  For non-urgent questions about your medical care, please call your primary care provider or clinic, [phone]  For questions related to your surgery, please call your surgery clinic        Attending Provider     Provider Specialty    Chacho Cates MD Surgery       Primary Care Provider Office Phone # Fax #    Kisha Diego PA-C [phone]      After Care Instructions     Diet       Follow this diet upon discharge: Regular                  Follow-up Appointments     Follow-up and recommended labs and tests        Follow up with Chacho vaca,16  Days  Post op                  Further instructions from your care team       TOTAL HIP REPLACEMENT TAKE HOME INSTRUCTIONS  Your surgeon will answer any questions about your progress. General guidelines for your care are listed below. Your surgeon may give additional instructions for your care at home. Please follow them carefully.    Activity Level  1. Physical  "activity may be resumed gradually according to your comfort level and your surgeon s instructions. Follow your exercise program as instructed by your therapist. Do exercises at least twice daily. Refer to pages 19-22 of your \"Total Hip Replacement \" booklet for details.  2. Complete exercises two hours before bedtime to minimize the effect pain may have on sleep.  3. Do not cross legs. Do not bend past 90 .    Good Health Practices  1. Maintain an adequate fluid intake and eat a well balanced diet.  2. Be sure to include the basic food groups, such as dairy products, meat/fish, vegetables, and fruit. Each of these foods contribute to wound healing and increasing your strength.  3. Surgery, decreased activity and pain medication all contribute to a descrease in bowel activity that can result in constipation. It is recommended that you increase your liquid intake, add fiber to your diet, increase activity, and decrease pain medication use. If you have any problems, notify your physician.  4. Notify your dentist of your total hip surgery and call your dentist one week before a dental appointment for antibiotics.  If dentist will not prescribe antibiotics call your surgeon to ask on next steps.      Incision/Dressing Care  1. Keep incision clean and dry.  2. Cover incision if you are still having drainage.  3.  If you have a waterproof dressing _____________________   Shower.    Things to Watch For  1. Check incision daily for increased redness, tenderness, swelling, or drainage along the incision line. If these occur, please notify your doctor. Also, call if you develop a fever above 101 .  2. Please notify your doctor if you experience any calf pain and/or if you have surgical pain not relieved by the pain medication prescribed by your doctor.              Pending Results     No orders found from 9/18/2017 to 9/21/2017.            Statement of Approval     Ordered          09/23/17 0704  I have reviewed and agree with " "all the recommendations and orders detailed in this document.  EFFECTIVE NOW     Approved and electronically signed by:  Chacho Cates MD             Admission Information     Date & Time Provider Department Dept. Phone    2017 Chacho Cates MD [place] [address] [facility] [phone]      Your Vitals Were     Blood Pressure Pulse Temperature Respirations Height Weight    111/67 (BP Location: Right arm) 78 98.1  F (36.7  C) (Oral) 16 1.626 m (5' 4\") 86.2 kg (190 lb)    Pulse Oximetry BMI (Body Mass Index)                93% 32.61 kg/m2          MyChart Information     [employer] lets you send messages to your doctor, view your test results, renew your prescriptions, schedule appointments and more. To sign up, go to [email or web address] . Click on \"Log in\" on the left side of the screen, which will take you to the Welcome page. Then click on \"Sign up Now\" on the right side of the page.     You will be asked to enter the access code listed below, as well as some personal information. Please follow the directions to create your username and password.     Your access code is: [email or web address]  Expires: 2017  9:24 AM     Your access code will  in 90 days. If you need help or a new code, please call your [facility] clinic or [phone].        Care EveryWhere ID     This is your Care EveryWhere ID. This could be used by other organizations to access your [facility] medical records  [id number]        Equal Access to Services     ANA BEST : Hadii julian cao Soeda, waaxda luqadaha, qaybta kaalmada ja, lakesha santa. So [facility] [phone].    ATENCIÓN: Si habla español, tiene a lopez disposición servicios gratuitos de asistencia lingüística. Llame al [phone].    We comply with applicable federal civil rights laws and [place] laws. We do not discriminate on the basis of race, color, national origin, age, disability sex, sexual orientation or " gender identity.               Review of your medicines      START taking        Dose / Directions    acetaminophen 325 MG tablet   Commonly known as:  TYLENOL        Dose:  975 mg   Take 3 tablets (975 mg) by mouth every 8 hours   Quantity:  100 tablet   Refills:  0       enoxaparin 40 MG/0.4ML injection   Commonly known as:  LOVENOX        Dose:  40 mg   Inject 0.4 mLs (40 mg) Subcutaneous every 24 hours   Quantity:  6 Syringe   Refills:  0       ferrous gluconate 324 (38 FE) MG tablet   Commonly known as:  FERGON        Dose:  324 mg   Take 1 tablet (324 mg) by mouth daily   Quantity:  60 tablet   Refills:  0       oxyCODONE 5 MG IR tablet   Commonly known as:  ROXICODONE        Dose:  5 mg   Take 1 tablet (5 mg) by mouth every 6 hours as needed for moderate to severe pain   Quantity:  60 tablet   Refills:  0       senna-docusate 8.6-50 MG per tablet   Commonly known as:  SENOKOT-S;PERICOLACE        Dose:  1-2 tablet   Take 1-2 tablets by mouth 2 times daily   Quantity:  60 tablet   Refills:  0         CONTINUE these medicines which may have CHANGED, or have new prescriptions. If we are uncertain of the size of tablets/capsules you have at home, strength may be listed as something that might have changed.        Dose / Directions    aspirin 325 MG tablet   This may have changed:    - medication strength  - how much to take  - how to take this  - when to take this  - additional instructions        Start taking on:  9/29/2017   325 mg  Bid with food  For 1  Week then  325 mg/day  For  2 more  Weeks  With food   Quantity:  90 tablet   Refills:  0         CONTINUE these medicines which have NOT CHANGED        Dose / Directions    ADVAIR HFA 45-21 MCG/ACT inhaler   Generic drug:  fluticasone-salmeterol        Dose:  1 puff   Inhale 1 puff into the lungs daily as needed   Refills:  0       HYDROCHLOROTHIAZIDE PO        Dose:  12.5 mg   Take 12.5 mg by mouth daily as needed   Refills:  0       IBUPROFEN PO        Dose:   200 mg   Take 200 mg by mouth daily as needed for moderate pain   Refills:  0       LEVOTHYROXINE SODIUM PO        Dose:  100 mcg   Take 100 mcg by mouth daily   Refills:  0       LISINOPRIL PO        Dose:  40 mg   Take 40 mg by mouth daily   Refills:  0       prenatal multivitamin plus iron 27-0.8 MG Tabs per tablet        Dose:  1 tablet   Take 1 tablet by mouth daily   Refills:  0       PROAIR HFA IN        Dose:  1-2 puff   Inhale 1-2 puffs into the lungs every 4 hours as needed (chest tightness/wheezing)   Refills:  0       VITAMIN D (CHOLECALCIFEROL) PO        Dose:  1000 Units   Take 1,000 Units by mouth daily   Refills:  0            Where to get your medicines      These medications were sent to [facility] - [address]  [address], [place] [address]     Phone:  [phone]     enoxaparin 40 MG/0.4ML injection    ferrous gluconate 324 (38 FE) MG tablet    senna-docusate 8.6-50 MG per tablet         Some of these will need a paper prescription and others can be bought over the counter. Ask your nurse if you have questions.     Bring a paper prescription for each of these medications     oxyCODONE 5 MG IR tablet       You don't need a prescription for these medications     acetaminophen 325 MG tablet    aspirin 325 MG tablet                Protect others around you: Learn how to safely use, store and throw away your medicines at www.disposemymeds.org.             Medication List: This is a list of all your medications and when to take them. Check marks below indicate your daily home schedule. Keep this list as a reference.      Medications           Morning Afternoon Evening Bedtime As Needed    acetaminophen 325 MG tablet   Commonly known as:  TYLENOL   Take 3 tablets (975 mg) by mouth every 8 hours   Last time this was given:  975 mg on 9/23/2017  5:52 AM                                   ADVAIR HFA 45-21 MCG/ACT inhaler   Inhale 1 puff into the lungs daily  as needed   Generic drug:  fluticasone-salmeterol                                   aspirin 325 MG tablet   325 mg  Bid with food  For 1  Week then  325 mg/day  For  2 more  Weeks  With food   Start taking on:  9/29/2017                                enoxaparin 40 MG/0.4ML injection   Commonly known as:  LOVENOX   Inject 0.4 mLs (40 mg) Subcutaneous every 24 hours   Last time this was given:  40 mg on 9/23/2017  9:21 AM   Next Dose Due:  9/24/17 9/24/17                       ferrous gluconate 324 (38 FE) MG tablet   Commonly known as:  FERGON   Take 1 tablet (324 mg) by mouth daily   Last time this was given:  324 mg on 9/23/2017  9:21 AM   Next Dose Due:  9/24/17 9/24/17                       HYDROCHLOROTHIAZIDE PO   Take 12.5 mg by mouth daily as needed                                   IBUPROFEN PO   Take 200 mg by mouth daily as needed for moderate pain                                   LEVOTHYROXINE SODIUM PO   Take 100 mcg by mouth daily   Last time this was given:  100 mcg on 9/23/2017  7:42 AM   Next Dose Due:  9/24/17 9/24/17                       LISINOPRIL PO   Take 40 mg by mouth daily   Last time this was given:  40 mg on 9/23/2017  9:21 AM   Next Dose Due:  9/24/17 9/24/17                       oxyCODONE 5 MG IR tablet   Commonly known as:  ROXICODONE   Take 1 tablet (5 mg) by mouth every 6 hours as needed for moderate to severe pain   Last time this was given:  10 mg on 9/23/2017  9:36 AM                                   prenatal multivitamin plus iron 27-0.8 MG Tabs per tablet   Take 1 tablet by mouth daily   Last time this was given:  1 tablet on 9/23/2017  9:21 AM   Next Dose Due:  9/24/17 9/24/17                       PROAIR HFA IN   Inhale 1-2 puffs into the lungs every 4 hours as needed (chest tightness/wheezing)                                   senna-docusate 8.6-50 MG per tablet   Commonly known as:  SENOKOT-S;PERICOLACE   Take 1-2  tablets by mouth 2 times daily   Last time this was given:  2 tablets on 9/23/2017  9:21 AM                                         VITAMIN D (CHOLECALCIFEROL) PO   Take 1,000 Units by mouth daily   Next Dose Due:  Resume at discharge.

## 2017-09-20 NOTE — PLAN OF CARE
Problem: Patient Care Overview  Goal: Plan of Care/Patient Progress Review  PT: Orders received, chart reviewed. Evaluation attempted. Patient reporting high levels of nausea at rest. Appropriate to hold PT evaluation this PM.

## 2017-09-20 NOTE — PROVIDER NOTIFICATION
Dr. Jesus notified regarding BP's after th 10mg Labetalol given, BP: 160's/100's.  Verbal order for 10mg hydralazline to be given.  Will continue to monitor.

## 2017-09-20 NOTE — PROGRESS NOTES
Admission medication history interview status for the 9/20/2017  admission is complete. See EPIC admission navigator for prior to admission medications     Medication history source reliability:Good    Medication history interview source(s):Patient    Medication history resources (including written lists, pill bottles, clinic record):email & brought inhalers    Primary pharmacy.[facility]    Additional medication history information not noted on PTA med list :None    Time spent in this activity: 40 min    Prior to Admission medications    Medication Sig Last Dose Taking? Auth Provider   IBUPROFEN PO Take 200 mg by mouth daily as needed for moderate pain 9/15/2017 at am Yes Reported, Patient   ASPIRIN PO Take 81 mg by mouth daily 9/6/2017 at on hold Yes Reported, Patient   Albuterol Sulfate (PROAIR HFA IN) Inhale 1-2 puffs into the lungs every 4 hours as needed (chest tightness/wheezing) more than 2 weeks at prn Yes Reported, Patient   HYDROCHLOROTHIAZIDE PO Take 12.5 mg by mouth daily as needed  9/17/2017 at am Yes Reported, Patient   LISINOPRIL PO Take 40 mg by mouth daily 9/20/2017 at 0630 Yes Reported, Patient   LEVOTHYROXINE SODIUM PO Take 100 mcg by mouth daily 9/20/2017 at 0630 Yes Reported, Patient   fluticasone-salmeterol (ADVAIR HFA) 45-21 MCG/ACT inhaler Inhale 1 puff into the lungs daily as needed  more than 2 weeks at prn Yes Reported, Patient   Prenatal Vit-Fe Fumarate-FA (PRENATAL MULTIVITAMIN PLUS IRON) 27-0.8 MG TABS per tablet Take 1 tablet by mouth daily 9/18/2017 at am Yes Reported, Patient   VITAMIN D, CHOLECALCIFEROL, PO Take 1,000 Units by mouth daily 9/18/2017 at am Yes Reported, Patient

## 2017-09-21 ENCOUNTER — APPOINTMENT (OUTPATIENT)
Dept: PHYSICAL THERAPY | Facility: CLINIC | Age: 67
DRG: 470 | End: 2017-09-21
Attending: ORTHOPAEDIC SURGERY
Payer: COMMERCIAL

## 2017-09-21 LAB
ANION GAP SERPL CALCULATED.3IONS-SCNC: 8 MMOL/L (ref 3–14)
BUN SERPL-MCNC: 10 MG/DL (ref 7–30)
CALCIUM SERPL-MCNC: 7.9 MG/DL (ref 8.5–10.1)
CHLORIDE SERPL-SCNC: 106 MMOL/L (ref 94–109)
CO2 SERPL-SCNC: 24 MMOL/L (ref 20–32)
CREAT SERPL-MCNC: 0.79 MG/DL (ref 0.52–1.04)
GFR SERPL CREATININE-BSD FRML MDRD: 72 ML/MIN/1.7M2
GLUCOSE SERPL-MCNC: 118 MG/DL (ref 70–99)
HGB BLD-MCNC: 10.7 G/DL (ref 11.7–15.7)
PLATELET # BLD AUTO: 224 10E9/L (ref 150–450)
POTASSIUM SERPL-SCNC: 3.8 MMOL/L (ref 3.4–5.3)
SODIUM SERPL-SCNC: 138 MMOL/L (ref 133–144)

## 2017-09-21 PROCEDURE — 97110 THERAPEUTIC EXERCISES: CPT | Mod: GP | Performed by: PHYSICAL THERAPIST

## 2017-09-21 PROCEDURE — 36415 COLL VENOUS BLD VENIPUNCTURE: CPT | Performed by: ORTHOPAEDIC SURGERY

## 2017-09-21 PROCEDURE — 12000007 ZZH R&B INTERMEDIATE

## 2017-09-21 PROCEDURE — 25000128 H RX IP 250 OP 636: Performed by: ORTHOPAEDIC SURGERY

## 2017-09-21 PROCEDURE — 40000193 ZZH STATISTIC PT WARD VISIT: Performed by: PHYSICAL THERAPIST

## 2017-09-21 PROCEDURE — 97116 GAIT TRAINING THERAPY: CPT | Mod: GP | Performed by: PHYSICAL THERAPY ASSISTANT

## 2017-09-21 PROCEDURE — 97530 THERAPEUTIC ACTIVITIES: CPT | Mod: GP | Performed by: PHYSICAL THERAPY ASSISTANT

## 2017-09-21 PROCEDURE — 40000193 ZZH STATISTIC PT WARD VISIT: Performed by: PHYSICAL THERAPY ASSISTANT

## 2017-09-21 PROCEDURE — 97530 THERAPEUTIC ACTIVITIES: CPT | Mod: GP | Performed by: PHYSICAL THERAPIST

## 2017-09-21 PROCEDURE — 25000132 ZZH RX MED GY IP 250 OP 250 PS 637: Mod: GY | Performed by: ORTHOPAEDIC SURGERY

## 2017-09-21 PROCEDURE — 97161 PT EVAL LOW COMPLEX 20 MIN: CPT | Mod: GP | Performed by: PHYSICAL THERAPIST

## 2017-09-21 PROCEDURE — 85018 HEMOGLOBIN: CPT | Performed by: ORTHOPAEDIC SURGERY

## 2017-09-21 PROCEDURE — A9270 NON-COVERED ITEM OR SERVICE: HCPCS | Mod: GY | Performed by: ORTHOPAEDIC SURGERY

## 2017-09-21 PROCEDURE — 97110 THERAPEUTIC EXERCISES: CPT | Mod: GP | Performed by: PHYSICAL THERAPY ASSISTANT

## 2017-09-21 PROCEDURE — 85049 AUTOMATED PLATELET COUNT: CPT | Performed by: ORTHOPAEDIC SURGERY

## 2017-09-21 PROCEDURE — 97116 GAIT TRAINING THERAPY: CPT | Mod: GP | Performed by: PHYSICAL THERAPIST

## 2017-09-21 PROCEDURE — 80048 BASIC METABOLIC PNL TOTAL CA: CPT | Performed by: ORTHOPAEDIC SURGERY

## 2017-09-21 RX ADMIN — LEVOTHYROXINE SODIUM 100 MCG: 100 TABLET ORAL at 07:11

## 2017-09-21 RX ADMIN — HYDROXYZINE HYDROCHLORIDE 10 MG: 10 TABLET ORAL at 01:16

## 2017-09-21 RX ADMIN — LISINOPRIL 40 MG: 40 TABLET ORAL at 08:06

## 2017-09-21 RX ADMIN — ENOXAPARIN SODIUM 40 MG: 40 INJECTION SUBCUTANEOUS at 08:24

## 2017-09-21 RX ADMIN — ACETAMINOPHEN 975 MG: 325 TABLET, FILM COATED ORAL at 19:19

## 2017-09-21 RX ADMIN — ACETAMINOPHEN 975 MG: 325 TABLET, FILM COATED ORAL at 11:10

## 2017-09-21 RX ADMIN — OXYCODONE HYDROCHLORIDE 5 MG: 5 TABLET ORAL at 16:54

## 2017-09-21 RX ADMIN — KETOROLAC TROMETHAMINE 15 MG: 15 INJECTION, SOLUTION INTRAMUSCULAR; INTRAVENOUS at 12:22

## 2017-09-21 RX ADMIN — SODIUM CHLORIDE: 9 INJECTION, SOLUTION INTRAVENOUS at 02:48

## 2017-09-21 RX ADMIN — CYCLOBENZAPRINE HYDROCHLORIDE 5 MG: 5 TABLET, FILM COATED ORAL at 08:00

## 2017-09-21 RX ADMIN — FERROUS GLUCONATE 324 MG: 324 TABLET ORAL at 08:06

## 2017-09-21 RX ADMIN — KETOROLAC TROMETHAMINE 15 MG: 15 INJECTION, SOLUTION INTRAMUSCULAR; INTRAVENOUS at 06:30

## 2017-09-21 RX ADMIN — PRENATAL VIT W/ FE FUMARATE-FA TAB 27-0.8 MG 1 TABLET: 27-0.8 TAB at 10:04

## 2017-09-21 RX ADMIN — CYCLOBENZAPRINE HYDROCHLORIDE 5 MG: 5 TABLET, FILM COATED ORAL at 19:19

## 2017-09-21 RX ADMIN — SENNOSIDES AND DOCUSATE SODIUM 2 TABLET: 8.6; 5 TABLET ORAL at 19:20

## 2017-09-21 RX ADMIN — OXYCODONE HYDROCHLORIDE 5 MG: 5 TABLET ORAL at 12:55

## 2017-09-21 RX ADMIN — OXYCODONE HYDROCHLORIDE 5 MG: 5 TABLET ORAL at 20:59

## 2017-09-21 RX ADMIN — SENNOSIDES AND DOCUSATE SODIUM 2 TABLET: 8.6; 5 TABLET ORAL at 08:06

## 2017-09-21 RX ADMIN — ACETAMINOPHEN 975 MG: 325 TABLET, FILM COATED ORAL at 02:53

## 2017-09-21 NOTE — PROGRESS NOTES
" 09/21/17 0900   Quick Adds   Type of Visit Initial PT Evaluation   Living Environment   Lives With spouse   Living Arrangements house   Home Accessibility stairs to enter home   Number of Stairs to Enter Home 2   Number of Stairs Within Home 0  (has stairs to basement but doesn't have to climb)   Stair Railings at Home (none on stairs to enter; 1 on stairs to basement)   Transportation Available car;family or friend will provide   Living Environment Comment one level home   Self-Care   Usual Activity Tolerance good   Current Activity Tolerance moderate   Regular Exercise (not currently; used to walk & do yoga)   Equipment Currently Used at Home none  (has WW and SEC)   Activity/Exercise/Self-Care Comment independent community ambulator; drives; works at [employer]   Functional Level Prior   Ambulation 0-->independent   Transferring 0-->independent   Toileting 0-->independent   Bathing 0-->independent   Dressing 0-->independent   Eating 0-->independent   Communication 0-->understands/communicates without difficulty   Swallowing 0-->swallows foods/liquids without difficulty   Cognition 0 - no cognition issues reported   Fall history within last six months no   Which of the above functional risks had a recent onset or change? ambulation;transferring;toileting;bathing   Prior Functional Level Comment had right hip pain that limited exercise ability but was still independent with all functional activity   General Information   Onset of Illness/Injury or Date of Surgery - Date 09/20/17   Referring Physician Chacho Caets MD   Patient/Family Goals Statement \"To be able to do what ever you say I should do.\"   Pertinent History of Current Problem (include personal factors and/or comorbidities that impact the POC) OA of right hip; underwent AMANDA, posterior approach on above indicated date.   Precautions/Limitations fall precautions;right hip precautions  (posterior approach)   Weight-Bearing Status - LUE full " weight-bearing   Weight-Bearing Status - RUE full weight-bearing   Weight-Bearing Status - LLE full weight-bearing   Weight-Bearing Status - RLE weight-bearing as tolerated   General Observations sitting at EOB after using commode; agreeable to participate   Cognitive Status Examination   Orientation orientation to person, place and time   Level of Consciousness alert   Follows Commands and Answers Questions 100% of the time   Personal Safety and Judgment intact   Memory intact   Pain Assessment   Patient Currently in Pain Yes, see Vital Sign flowsheet  (not rated)   Integumentary/Edema   Integumentary/Edema no deficits were identifed   Posture    Posture Not impaired   Range of Motion (ROM)   ROM Comment generally all limbs WNL's, right hip able to flex to 90 degrees   Strength   Strength Comments generally 5/5; right hip able to move against gravity with increased pain (strength not formally assessed)   Bed Mobility   Bed Mobility Comments Sit to and from supine with Min A   Transfer Skills   Transfer Comments Sit to and from stand with CGA and cueing for hip precautions and hand placement   Gait   Gait Comments Ambulated 5' for eval with WW and CGA plus cueing for gait pattern   Balance   Balance Comments requiring WW in stance and above noted assistance   Sensory Examination   Sensory Perception Comments not assessed   Coordination   Coordination no deficits were identified   Muscle Tone   Muscle Tone no deficits were identified   Modality Interventions   Planned Modality Interventions Comments ice prn   General Therapy Interventions   Planned Therapy Interventions bed mobility training;gait training;ROM;strengthening;transfer training;home program guidelines;progressive activity/exercise   Clinical Impression   Criteria for Skilled Therapeutic Intervention yes, treatment indicated   PT Diagnosis decreased functional mobility   Influenced by the following impairments pain, post surgical weakness, fatigue  "  Functional limitations due to impairments decreased independence in functional mobility   Clinical Presentation Stable/Uncomplicated   Clinical Presentation Rationale stable vitals and function post-op   Clinical Decision Making (Complexity) Low complexity   Therapy Frequency` 2 times/day   Predicted Duration of Therapy Intervention (days/wks) 3 days   Anticipated Equipment Needs at Discharge (has WW but stripped the wheels; may need wheels)   Anticipated Discharge Disposition Home with Assist  (per patient)   Risk & Benefits of therapy have been explained Yes   Patient, Family & other staff in agreement with plan of care Yes   [facility] AM-PAC  \"6 Clicks\" V.2 Basic Mobility Inpatient Short Form   1. Turning from your back to your side while in a flat bed without using bedrails? 3 - A Little   2. Moving from lying on your back to sitting on the side of a flat bed without using bedrails? 3 - A Little   3. Moving to and from a bed to a chair (including a wheelchair)? 3 - A Little   4. Standing up from a chair using your arms (e.g., wheelchair, or bedside chair)? 3 - A Little   5. To walk in hospital room? 3 - A Little   6. Climbing 3-5 steps with a railing? 3 - A Little   Basic Mobility Raw Score (Score out of 24.Lower scores equate to lower levels of function) 18   Total Evaluation Time   Total Evaluation Time (Minutes) 15     "

## 2017-09-21 NOTE — PLAN OF CARE
Problem: Patient Care Overview  Goal: Plan of Care/Patient Progress Review  Outcome: Therapy, progress towards functional goals is fair  A&O x4, stood at bedside Ax1, emesis x1 and nausea throughout shift. Compazine, zofran, and sea band with some relief. Tolerating sips of water. Hip dressing CDI. Pain managed with Toradol. CMS intact. VSS on 1L while resting. Baker patent. Drain patent. D/C pending

## 2017-09-21 NOTE — PROGRESS NOTES
A&OX4.  No complaints of nausea this shift, zofran X1 per request.  Baker removed at 0600 and patient is due to void.  Hemovac patent.  Taking IV toradol and atarax for pain.  IV fluids infusing.  2L oxygen via nasal cannula, all other VSS.

## 2017-09-21 NOTE — OP NOTE
DATE OF PROCEDURE:  09/20/2017      PREOPERATIVE DIAGNOSIS:  End-stage arthrosis of the right hip.      POSTOPERATIVE DIAGNOSIS:  End-stage arthrosis of the right hip.      PROCEDURE:  Right total hip arthroplasty.        SURGEON:  Chacho Cates MD      ASSISTANT:   DESI Ham      ANESTHESIA:  General.      ESTIMATED BLOOD LOSS:  200 mL.      COMPLICATIONS:  None.      IMPLANT:  DePuy press-fit system [place] cup size 56 neutral lipped liner with a +4 x 36 diameter 2 screws.  Stem was a size 6 high offset with a 36 head standard length.      ANTIBIOTICS:  Given preop vancomycin because of some alleged positive nose cultures.      DVT PROPHYLAXIS:  Lovenox, sequential calf compression devices.      DESCRIPTION OF PROCEDURE:  Jayshree Hastings was brought into the operating room, prepped and draped in the usual fashion.  Preoperatively thorough review had been gone over with her about risk/benefit ratios.  She is quite overweight.  She understands that is a significant risk factor.  Lateral position, right hip up, prepped and draped in the usual fashion.  Timeout was requested.  All team members agreed.  We proceeded.  Tried to make as small an incision as possible but the depth necessitated some extra length.      Exposed the deep fascia, incised it.  Identified the femoral head.  Brought it up.  Dislocated it resected a little bit less than a fingerbreadth above the lesser trochanter, then a sharp acetabular retractor anterior medially, blunt one inferiorly.  Removed the labrum.  Reamed the hip up to 55.  Trial liner was placed in there, 56 cup placed in.  Tested and adjusted anteversion as we could doing trial reductions.  Trial liner placed in the cup and brought the femoral neck up lateralized it, reamed and rasped appropriately.  Size 6 bottomed out, fit nicely.  Withdrew the trial components, put the permanent liner in.  Stem was brought in.  Tried to antevert it about 12-15 degrees, keeping it  out of varus and once it was down, we completed trial reduction extremely stable in all positions.      We irrigated copiously.  Again went with a 36 standard length, which was 1.5.  Liner was inspected and was intact.  Injected the anterior capsule, posterior capsule and diffusely with 60 mL of Marcaine half with 0.25% without epinephrine.  Short external rotators were tagged with a #5 FiberWire, earlier we tagged through drill hole in the trochanter as well as the posterior aspect of the gluteus medius tendon.  Capsule was reapproximated as well.      We then proceeded at that point to deep fascia closed with interrupted 0 Ethibond with a simple #2 FiberWire.  Drain brought out and injected tranexamic acid at closure.  Subq deep, 0 V-Loc, 2-0 Vicryl, staples and 0 Prolene in the skin.  Irrigated copiously and then pressure wrap applied abductor pillow placed.      She is really quite stable.  She can easily flex to 120 degrees, we just avoid the typical adduction, internal rotation with hyperflexion.      OPERATIVE PROCEDURE:     1.  Right total hip arthroplasty secondary to osteoarthritis.   2.  Implant DePuy [place] cup size 56 neutral liner for a 36 +4 thick.  The head was standard 36 +1.5 stem was a 6 high offset.         WICHO ARCE MD             D: 2017 21:32   T: 2017 00:31   MT: [id number]      Name:     ERICA SANTANA   MRN:      [id number]        Account:        [id number]   :      1950           Procedure Date: 2017      Document: [id number]

## 2017-09-21 NOTE — PROGRESS NOTES
Jayshree CHERYL PEGUERO Oswaldoshiras  2017  POD #1    Doing well.  No immediate surgical complications identified.  No excessive bleeding  Temperatures:  Current - Temp: 97.8  F (36.6  C); Max - Temp  Av.9  F (36.6  C)  Min: 97.5  F (36.4  C)  Max: 98.5  F (36.9  C)  Pulse range: Pulse  Av  Min: 77  Max: 90  Blood pressure range: Systolic (24hrs), Av , Min:110 , Max:175   ; Diastolic (24hrs), Av, Min:64, Max:115    CMS: intact  Labs:   Results for orders placed or performed during the hospital encounter of 17 (from the past 24 hour(s))   XR Pelvis Port 1/2 Views    Narrative    PELVIS WITH UNILATERAL HIP ONE VIEW RIGHT  2017 3:35 PM     HISTORY: Hip arthroplasty    COMPARISON: None.     FINDINGS:    There is a hip arthroplasty in anatomic alignment with  well-seated components.      Impression    IMPRESSION:   Hip arthroplasty in anatomic alignment.    ANOOP HUGGINS MD   Hemoglobin   Result Value Ref Range    Hemoglobin 10.7 (L) 11.7 - 15.7 g/dL   Basic metabolic panel   Result Value Ref Range    Sodium 138 133 - 144 mmol/L    Potassium 3.8 3.4 - 5.3 mmol/L    Chloride 106 94 - 109 mmol/L    Carbon Dioxide 24 20 - 32 mmol/L    Anion Gap 8 3 - 14 mmol/L    Glucose 118 (H) 70 - 99 mg/dL    Urea Nitrogen 10 7 - 30 mg/dL    Creatinine 0.79 0.52 - 1.04 mg/dL    GFR Estimate 72 >60 mL/min/1.7m2    GFR Estimate If Black 88 >60 mL/min/1.7m2    Calcium 7.9 (L) 8.5 - 10.1 mg/dL   Platelet count   Result Value Ref Range    Platelet Count 224 150 - 450 10e9/L       PLAN:  Continue physical therapy  Discharge plan: Saturday  To home

## 2017-09-21 NOTE — PLAN OF CARE
"Problem: Patient Care Overview  Goal: Plan of Care/Patient Progress Review  PT:  Patient seen for initial evaluation and treatment.  At baseline patient lives with  in house with 2 steps to enter without a rail; then able to live on one level.  At baseline patient ambulates without an AD and works outside the home.  Has been limited by hip pain for several months so has not been as active with exercise as she was prior when she walked and did yoga for exercise.       Discharge Planner PT   Patient plan for discharge: Patient states she plans to discharge to home with her  at D/C. Also states she plans to discharge \"tomorrow\".   Current status: Min A for sit to and from supine.  CGA for sit to and from stand.  Ambulated 40' and 50' with WW and CGA.  Cuing for gait pattern.  Cueing for AMANDA (posteior approach) precautions throughout.  Tolerated session well.   brought in patient's walker which he had removed the wheels from the legs and put \"skis\" on instead where the wheels should have been.  He believes he stripped the threads on the wheels and will not be able to use them.  Instructed to bring them in so we can see if we can fix or else patient can be issued new wheels.  Barriers to return to prior living situation: Current fatigue and slight assistance required for transfers/ mobility; anticipate will improve.  Recommendations for discharge: TBD POD #2  Rationale for recommendations: TBD POD #2       Entered by: Janneth Yan 09/21/2017 11:06 AM                   "

## 2017-09-21 NOTE — PLAN OF CARE
Problem: Patient Care Overview  Goal: Plan of Care/Patient Progress Review  Outcome: Improving  A/Ox4.  C/o right leg spasms improved with reposition and flexeril.  States right hip aching/throbbing controlled with rest, repo, sched tyl, prn toradol.  LS clear.  Denies SOB.  Encourage CDB and IS.  Tolerating diet.  Denies nausea.  +flatus.  Voiding without difficulty.  Ambulates SBA with walker/belt.  Moves well.  BUE/BLE +CMS.  HV d/c'd without difficulty.  VSS except /81.  Pleasant, cooperative.  POC reviewed with pt and .  Questions/concerns answered.

## 2017-09-22 ENCOUNTER — APPOINTMENT (OUTPATIENT)
Dept: PHYSICAL THERAPY | Facility: CLINIC | Age: 67
DRG: 470 | End: 2017-09-22
Attending: ORTHOPAEDIC SURGERY
Payer: COMMERCIAL

## 2017-09-22 ENCOUNTER — APPOINTMENT (OUTPATIENT)
Dept: OCCUPATIONAL THERAPY | Facility: CLINIC | Age: 67
DRG: 470 | End: 2017-09-22
Attending: ORTHOPAEDIC SURGERY
Payer: COMMERCIAL

## 2017-09-22 LAB
CREAT SERPL-MCNC: 0.81 MG/DL (ref 0.52–1.04)
GFR SERPL CREATININE-BSD FRML MDRD: 70 ML/MIN/1.7M2
GLUCOSE SERPL-MCNC: 112 MG/DL (ref 70–99)
HGB BLD-MCNC: 10.2 G/DL (ref 11.7–15.7)
PLATELET # BLD AUTO: 235 10E9/L (ref 150–450)

## 2017-09-22 PROCEDURE — 36415 COLL VENOUS BLD VENIPUNCTURE: CPT | Performed by: ORTHOPAEDIC SURGERY

## 2017-09-22 PROCEDURE — 85049 AUTOMATED PLATELET COUNT: CPT | Performed by: ORTHOPAEDIC SURGERY

## 2017-09-22 PROCEDURE — 85018 HEMOGLOBIN: CPT | Performed by: ORTHOPAEDIC SURGERY

## 2017-09-22 PROCEDURE — 12000007 ZZH R&B INTERMEDIATE

## 2017-09-22 PROCEDURE — 40000193 ZZH STATISTIC PT WARD VISIT: Performed by: PHYSICAL THERAPIST

## 2017-09-22 PROCEDURE — 97535 SELF CARE MNGMENT TRAINING: CPT | Mod: GO

## 2017-09-22 PROCEDURE — 97110 THERAPEUTIC EXERCISES: CPT | Mod: GP | Performed by: PHYSICAL THERAPIST

## 2017-09-22 PROCEDURE — 82565 ASSAY OF CREATININE: CPT | Performed by: ORTHOPAEDIC SURGERY

## 2017-09-22 PROCEDURE — 40000133 ZZH STATISTIC OT WARD VISIT

## 2017-09-22 PROCEDURE — 82947 ASSAY GLUCOSE BLOOD QUANT: CPT | Performed by: ORTHOPAEDIC SURGERY

## 2017-09-22 PROCEDURE — 25000132 ZZH RX MED GY IP 250 OP 250 PS 637: Mod: GY | Performed by: ORTHOPAEDIC SURGERY

## 2017-09-22 PROCEDURE — 97165 OT EVAL LOW COMPLEX 30 MIN: CPT | Mod: GO

## 2017-09-22 PROCEDURE — A9270 NON-COVERED ITEM OR SERVICE: HCPCS | Mod: GY | Performed by: ORTHOPAEDIC SURGERY

## 2017-09-22 PROCEDURE — 97530 THERAPEUTIC ACTIVITIES: CPT | Mod: GO

## 2017-09-22 PROCEDURE — 97530 THERAPEUTIC ACTIVITIES: CPT | Mod: GP | Performed by: PHYSICAL THERAPIST

## 2017-09-22 PROCEDURE — 25000128 H RX IP 250 OP 636: Performed by: ORTHOPAEDIC SURGERY

## 2017-09-22 PROCEDURE — 97116 GAIT TRAINING THERAPY: CPT | Mod: GP | Performed by: PHYSICAL THERAPIST

## 2017-09-22 RX ORDER — OXYCODONE HYDROCHLORIDE 5 MG/1
5 TABLET ORAL EVERY 6 HOURS PRN
Qty: 60 TABLET | Refills: 0 | Status: ON HOLD | OUTPATIENT
Start: 2017-09-22 | End: 2019-07-02

## 2017-09-22 RX ORDER — ASPIRIN 325 MG
TABLET ORAL
Qty: 90 TABLET | Refills: 0 | COMMUNITY
Start: 2017-09-29 | End: 2020-02-02

## 2017-09-22 RX ORDER — FERROUS GLUCONATE 324(38)MG
324 TABLET ORAL DAILY
Qty: 60 TABLET | Refills: 0 | Status: ON HOLD | OUTPATIENT
Start: 2017-09-22 | End: 2019-07-02

## 2017-09-22 RX ORDER — ACETAMINOPHEN 325 MG/1
975 TABLET ORAL EVERY 8 HOURS
Qty: 100 TABLET | Refills: 0 | COMMUNITY
Start: 2017-09-22 | End: 2020-02-02

## 2017-09-22 RX ORDER — AMOXICILLIN 250 MG
1-2 CAPSULE ORAL 2 TIMES DAILY
Qty: 60 TABLET | Refills: 0 | Status: ON HOLD | OUTPATIENT
Start: 2017-09-22 | End: 2019-07-02

## 2017-09-22 RX ADMIN — SENNOSIDES AND DOCUSATE SODIUM 2 TABLET: 8.6; 5 TABLET ORAL at 20:12

## 2017-09-22 RX ADMIN — OXYCODONE HYDROCHLORIDE 5 MG: 5 TABLET ORAL at 00:51

## 2017-09-22 RX ADMIN — ACETAMINOPHEN 975 MG: 325 TABLET, FILM COATED ORAL at 20:12

## 2017-09-22 RX ADMIN — SENNOSIDES AND DOCUSATE SODIUM 2 TABLET: 8.6; 5 TABLET ORAL at 07:56

## 2017-09-22 RX ADMIN — OXYCODONE HYDROCHLORIDE 10 MG: 5 TABLET ORAL at 17:18

## 2017-09-22 RX ADMIN — OXYCODONE HYDROCHLORIDE 10 MG: 5 TABLET ORAL at 07:56

## 2017-09-22 RX ADMIN — LEVOTHYROXINE SODIUM 100 MCG: 100 TABLET ORAL at 06:45

## 2017-09-22 RX ADMIN — LISINOPRIL 40 MG: 40 TABLET ORAL at 07:56

## 2017-09-22 RX ADMIN — ENOXAPARIN SODIUM 40 MG: 40 INJECTION SUBCUTANEOUS at 07:55

## 2017-09-22 RX ADMIN — ACETAMINOPHEN 975 MG: 325 TABLET, FILM COATED ORAL at 03:59

## 2017-09-22 RX ADMIN — OXYCODONE HYDROCHLORIDE 5 MG: 5 TABLET ORAL at 03:59

## 2017-09-22 RX ADMIN — ACETAMINOPHEN 975 MG: 325 TABLET, FILM COATED ORAL at 12:15

## 2017-09-22 RX ADMIN — FERROUS GLUCONATE 324 MG: 324 TABLET ORAL at 07:56

## 2017-09-22 RX ADMIN — OXYCODONE HYDROCHLORIDE 10 MG: 5 TABLET ORAL at 12:15

## 2017-09-22 RX ADMIN — PRENATAL VIT W/ FE FUMARATE-FA TAB 27-0.8 MG 1 TABLET: 27-0.8 TAB at 07:56

## 2017-09-22 RX ADMIN — OXYCODONE HYDROCHLORIDE 10 MG: 5 TABLET ORAL at 21:46

## 2017-09-22 ASSESSMENT — ACTIVITIES OF DAILY LIVING (ADL): PREVIOUS_RESPONSIBILITIES: MEAL PREP;HOUSEKEEPING;LAUNDRY;DRIVING;WORK

## 2017-09-22 NOTE — PROGRESS NOTES
Jayshree Hastings  2017  POD # 2 Right Total Hip Arthroplasty    Doing well.  Clean wound without signs of infection.  Normal healing wound.  No immediate surgical complications identified.  No excessive bleeding  Pain well-controlled.  Tolerating physical therapy and rehabilitation well.  Temperatures:  Current - Temp: 98.7  F (37.1  C); Max - Temp  Av.5  F (36.9  C)  Min: 98.1  F (36.7  C)  Max: 98.9  F (37.2  C)  Pulse range: No Data Recorded  Blood pressure range: Systolic (24hrs), Av , Min:108 , Max:135   ; Diastolic (24hrs), Av, Min:66, Max:82    CMS  Labs:   Results for orders placed or performed during the hospital encounter of 17 (from the past 24 hour(s))   Hemoglobin   Result Value Ref Range    Hemoglobin 10.2 (L) 11.7 - 15.7 g/dL   Platelet count   Result Value Ref Range    Platelet Count 235 150 - 450 10e9/L   Creatinine   Result Value Ref Range    Creatinine 0.81 0.52 - 1.04 mg/dL    GFR Estimate 70 >60 mL/min/1.7m2    GFR Estimate If Black 85 >60 mL/min/1.7m2   Glucose   Result Value Ref Range    Glucose 112 (H) 70 - 99 mg/dL       PLAN: Home tomorrow

## 2017-09-22 NOTE — PLAN OF CARE
Problem: Patient Care Overview  Goal: Plan of Care/Patient Progress Review  Outcome: Improving  LS diminished in bases - productive cough - IS encouraged, up with SBA and walker, pain #2-3/10 - PRN Oxycodone with scheduled Tylenol given this shift to manage pain, and patient is progressing toward plan of care and will discharge home tomorrow with spouse.

## 2017-09-22 NOTE — PROGRESS NOTES
09/22/17 1026   Quick Adds   Type of Visit Initial Occupational Therapy Evaluation   Living Environment   Lives With spouse   Living Arrangements house   Home Accessibility stairs to enter home;stairs within home   Number of Stairs to Enter Home 3   Number of Stairs Within Home 12   Transportation Available car;family or friend will provide  (Pt still drives; however, family can provide for now.)   Self-Care   Usual Activity Tolerance good   Current Activity Tolerance moderate   Equipment Currently Used at Home none   Functional Level Prior   Ambulation 0-->independent   Transferring 0-->independent   Toileting 0-->independent   Bathing 0-->independent   Dressing 0-->independent   Eating 0-->independent   Communication 0-->understands/communicates without difficulty   Swallowing 0-->swallows foods/liquids without difficulty   Cognition 0 - no cognition issues reported   Fall history within last six months no   General Information   Onset of Illness/Injury or Date of Surgery - Date 09/20/17   Referring Physician Chacho Cates MD   Patient/Family Goals Statement Pt plans to discharge home tomorrow   Additional Occupational Profile Info/Pertinent History of Current Problem Seen POD #2 of R AMANDA using posterior approach.    Precautions/Limitations fall precautions;right hip precautions   Weight-Bearing Status - RLE weight-bearing as tolerated   Cognitive Status Examination   Orientation orientation to person, place and time   Level of Consciousness alert   Able to Follow Commands WNL/WFL   Personal Safety (Cognitive) WNL/WFL   Memory intact   Pain Assessment   Patient Currently in Pain Yes, see Vital Sign flowsheet  (minimal)   Transfer Skills   Transfer Transfer Safety Analysis Bed/Chair;Transfer Skill: Stand to Sit;Transfer Safety Analysis Sit/Stand   Transfer Skill: Bed to Chair/Chair to Bed   Level of [place]: Bed to Chair stand-by assist   Weight-Bearing Restrictions weight-bearing as tolerated   Assistive  Device - Transfer Skill Bed to Chair Chair to Bed Rehab Eval standard walker   Transfer Safety Analysis Bed/Chair   Impairments Contributing to Impaired Transfers pain   Transfer Skill: Sit to Stand   Level of [place]: Sit/Stand stand-by assist   Transfer Skill: Sit to Stand weight-bearing as tolerated   Assistive Device for Transfer: Sit/Stand standard walker   Transfer Safety Analysis Sit/Stand   Impaired Transfers: Sit/Stand pain   Transfer Skill: Toilet Transfer   Level of [place]: Toilet stand-by assist   Weight-Bearing Restrictions: Toilet weight-bearing as tolerated   Assistive Device standard walker;seat riser;grab bars   Upper Body Dressing   Level of [place]: Dress Upper Body independent   Lower Body Dressing   Level of [place]: Dress Lower Body moderate assist (50% patients effort)   Toileting   Level of [place]: Toilet stand-by assist   Grooming   Level of [place]: Grooming independent   Eating/Self Feeding   Level of [place]: Eating independent   Instrumental Activities of Daily Living (IADL)   Previous Responsibilities meal prep;housekeeping;laundry;driving;work   IADL Comments Pt works at [employer]   Activities of Daily Living Analysis   Impairments Contributing to Impaired Activities of Daily Living pain;post surgical precautions   General Therapy Interventions   Planned Therapy Interventions ADL retraining;transfer training   Clinical Impression   Criteria for Skilled Therapeutic Interventions Met yes, treatment indicated   OT Diagnosis Decreased I and safety with ADLs   Influenced by the following impairments Pain; R hip precautions   Assessment of Occupational Performance 1-3 Performance Deficits   Identified Performance Deficits Dressing; Bathing; Toileting   Clinical Decision Making (Complexity) Low complexity   Therapy Frequency daily   Predicted Duration of Therapy Intervention (days/wks) 2 days   Anticipated Discharge Disposition Home with Assist   Risks and  "Benefits of Treatment have been explained. Yes   Patient, Family & other staff in agreement with plan of care Yes   [facility] TM \"6 Clicks\"   2016, Trustees of [facility], under license to [employer].  All rights reserved.   6 Clicks Short Forms Daily Activity Inpatient Short Form   [facility]-P[facility]  \"6 Clicks\" Daily Activity Inpatient Short Form   1. Putting on and taking off regular lower body clothing? 2 - A Lot   2. Bathing (including washing, rinsing, drying)? 2 - A Lot   3. Toileting, which includes using toilet, bedpan or urinal? 3 - A Little   4. Putting on and taking off regular upper body clothing? 4 - None   5. Taking care of personal grooming such as brushing teeth? 4 - None   6. Eating meals? 4 - None   Daily Activity Raw Score (Score out of 24.Lower scores equate to lower levels of function) 19   Total Evaluation Time   Total Evaluation Time (Minutes) 12     "

## 2017-09-22 NOTE — PLAN OF CARE
Problem: Patient Care Overview  Goal: Plan of Care/Patient Progress Review  Outcome: No Change  A&O.  VSS, RA.  CMS intact.  Dressing CDI.  Pain managed with oxy and flexeril.  Tolerating regular diet.  Up x 1 with GB and walker in hallway.  Voiding in BR.  IV SL.  ISO for MRSA in nares.  Progressing per POC.

## 2017-09-22 NOTE — PLAN OF CARE
Problem: Patient Care Overview  Goal: Plan of Care/Patient Progress Review  Outcome: Improving  VSS, A&Ox4. CMS intact, dressing CDI. Up A1 to BR and voiding adequately. Oxycodone given for pain control. Pt slept between cares. Will continue to monitor.

## 2017-09-22 NOTE — PLAN OF CARE
Problem: Patient Care Overview  Goal: Plan of Care/Patient Progress Review  OT: Eval complete and Tx initiated. Seen POD #2 of R AMANDA using posterior approach. Prior to surgery, pt lives in house with  and reports I with ADL/IADLs, including work and driving.     Discharge Planner OT   Patient plan for discharge: Home tomorrow  Current status: Pt required SBA with DME and FWW for toilet transfer. Pt used AE with SBA for LE dressing. Pt has necessary AE/DME at home already.   Barriers to return to prior living situation: Stairs to enter home  Recommendations for discharge: Home with increased A from family for ADL/IADLs as needed  Rationale for recommendations: Pt limited by pain and R hip precautions; thus, pt would benefit from one additional session tomorrow before discharge home.        Entered by: Brandon Delarosa 09/22/2017 11:04 AM

## 2017-09-22 NOTE — PLAN OF CARE
Problem: Patient Care Overview  Goal: Plan of Care/Patient Progress Review  Discharge Planner PT   Patient plan for discharge: home with spouse, per chart per MD home sat.  Current status: min A for bed mob, CGA for transfers, SBA for amb on level with FWW with slow steady gait, min A for steps with SEC, gaurang LE ex well  Barriers to return to prior living situation: none  Recommendations for discharge: home with spouse, will need FWW and possibly SEC for stairs, cont HEP  Rationale for recommendations: to maximize surgical outcome and I and safety in mob       Entered by: BUDDY SCHMITT 09/22/2017 11:03 AM

## 2017-09-23 ENCOUNTER — APPOINTMENT (OUTPATIENT)
Dept: OCCUPATIONAL THERAPY | Facility: CLINIC | Age: 67
DRG: 470 | End: 2017-09-23
Attending: ORTHOPAEDIC SURGERY
Payer: COMMERCIAL

## 2017-09-23 ENCOUNTER — APPOINTMENT (OUTPATIENT)
Dept: PHYSICAL THERAPY | Facility: CLINIC | Age: 67
DRG: 470 | End: 2017-09-23
Attending: ORTHOPAEDIC SURGERY
Payer: COMMERCIAL

## 2017-09-23 VITALS
RESPIRATION RATE: 16 BRPM | SYSTOLIC BLOOD PRESSURE: 111 MMHG | HEART RATE: 78 BPM | BODY MASS INDEX: 32.44 KG/M2 | HEIGHT: 64 IN | WEIGHT: 190 LBS | OXYGEN SATURATION: 93 % | TEMPERATURE: 98.1 F | DIASTOLIC BLOOD PRESSURE: 67 MMHG

## 2017-09-23 LAB — PLATELET # BLD AUTO: 230 10E9/L (ref 150–450)

## 2017-09-23 PROCEDURE — 40000133 ZZH STATISTIC OT WARD VISIT: Performed by: OCCUPATIONAL THERAPIST

## 2017-09-23 PROCEDURE — 25000128 H RX IP 250 OP 636: Performed by: ORTHOPAEDIC SURGERY

## 2017-09-23 PROCEDURE — 85049 AUTOMATED PLATELET COUNT: CPT | Performed by: ORTHOPAEDIC SURGERY

## 2017-09-23 PROCEDURE — 25000132 ZZH RX MED GY IP 250 OP 250 PS 637: Mod: GY | Performed by: ORTHOPAEDIC SURGERY

## 2017-09-23 PROCEDURE — 36415 COLL VENOUS BLD VENIPUNCTURE: CPT | Performed by: ORTHOPAEDIC SURGERY

## 2017-09-23 PROCEDURE — 97535 SELF CARE MNGMENT TRAINING: CPT | Mod: GO | Performed by: OCCUPATIONAL THERAPIST

## 2017-09-23 PROCEDURE — A9270 NON-COVERED ITEM OR SERVICE: HCPCS | Mod: GY | Performed by: ORTHOPAEDIC SURGERY

## 2017-09-23 PROCEDURE — 25000125 ZZHC RX 250: Performed by: ORTHOPAEDIC SURGERY

## 2017-09-23 PROCEDURE — 97530 THERAPEUTIC ACTIVITIES: CPT | Mod: GP | Performed by: PHYSICAL THERAPIST

## 2017-09-23 PROCEDURE — 40000193 ZZH STATISTIC PT WARD VISIT: Performed by: PHYSICAL THERAPIST

## 2017-09-23 RX ADMIN — FERROUS GLUCONATE 324 MG: 324 TABLET ORAL at 09:21

## 2017-09-23 RX ADMIN — ACETAMINOPHEN 975 MG: 325 TABLET, FILM COATED ORAL at 05:52

## 2017-09-23 RX ADMIN — OXYCODONE HYDROCHLORIDE 10 MG: 5 TABLET ORAL at 01:47

## 2017-09-23 RX ADMIN — PRENATAL VIT W/ FE FUMARATE-FA TAB 27-0.8 MG 1 TABLET: 27-0.8 TAB at 09:21

## 2017-09-23 RX ADMIN — LISINOPRIL 40 MG: 40 TABLET ORAL at 09:21

## 2017-09-23 RX ADMIN — OXYCODONE HYDROCHLORIDE 10 MG: 5 TABLET ORAL at 09:36

## 2017-09-23 RX ADMIN — PROCHLORPERAZINE MALEATE 5 MG: 5 TABLET, FILM COATED ORAL at 12:10

## 2017-09-23 RX ADMIN — ENOXAPARIN SODIUM 40 MG: 40 INJECTION SUBCUTANEOUS at 09:21

## 2017-09-23 RX ADMIN — SENNOSIDES AND DOCUSATE SODIUM 2 TABLET: 8.6; 5 TABLET ORAL at 09:21

## 2017-09-23 RX ADMIN — LEVOTHYROXINE SODIUM 100 MCG: 100 TABLET ORAL at 07:42

## 2017-09-23 RX ADMIN — OXYCODONE HYDROCHLORIDE 10 MG: 5 TABLET ORAL at 05:52

## 2017-09-23 RX ADMIN — ONDANSETRON 4 MG: 4 TABLET, ORALLY DISINTEGRATING ORAL at 10:25

## 2017-09-23 NOTE — PROGRESS NOTES
Pt is A&Ox4, IV removed and VSS on RA. Pt gets up with 1 and walker. She has been voiding adequate amounts in the bathroom and is passing gas. Pt is tolerating a regular diet well. Zofran given x1 for nausea. Pain is well controlled with oxycodone. R)arm hematoma is improving. Dressing changed to aquacel today, incision is WNL and CMS intact with +2 pulses. Plan is for discharge home today. All belongings will be sent with the pt at discharge.

## 2017-09-23 NOTE — PLAN OF CARE
Problem: PT General Care Plan  Goal: PT Goal 1  PT Goal 1   Physical Therapy Discharge Summary     Reason for therapy discharge:    Discharged to home.     Progress towards therapy goal(s). See goals on [facility] record for goal details.  Goals partially met.  Barriers to achieving goals:   discharge from facility.     Therapy recommendation(s):    No further therapy is recommended.

## 2017-09-23 NOTE — PLAN OF CARE
Problem: Patient Care Overview  Goal: Plan of Care/Patient Progress Review  Discharge Planner PT   Patient plan for discharge: Home with assist of     Current status: Patient currently feeling nauseated so requested just to verbally review how to do steps. Patient did demonstrate sup to sit and needed min assist with right LE ( can provide), and modified independent with sit to stand and amb.   Barriers to return to prior living situation: Steps without a rail but  will be available to assist and patient able to express understanding of how to do stairs.   Recommendations for discharge: Home with assist of   Rationale for recommendations: Patient needs very little assist and what she does need, her  can provide.        Entered by: Mae Lord 09/23/2017 12:09 PM

## 2017-09-23 NOTE — PLAN OF CARE
Problem: Patient Care Overview  Goal: Plan of Care/Patient Progress Review  Outcome: Improving  POD#: 3. A&Ox4. UWA 1. Pain controlled with oxy and ice. Voiding adequately via BSC. On droplet precautions for MRSA. VSS on RA. No IV access. CMS baseline. Dressing CDI. Plan for possible DC today. Monitored hematoma on arm, improving greatly after use of heat pack. Continue to monitor.

## 2017-09-23 NOTE — DISCHARGE INSTRUCTIONS
"TOTAL HIP REPLACEMENT TAKE HOME INSTRUCTIONS  Your surgeon will answer any questions about your progress. General guidelines for your care are listed below. Your surgeon may give additional instructions for your care at home. Please follow them carefully.    Activity Level  1. Physical activity may be resumed gradually according to your comfort level and your surgeon s instructions. Follow your exercise program as instructed by your therapist. Do exercises at least twice daily. Refer to pages 19-22 of your \"Total Hip Replacement \" booklet for details.  2. Complete exercises two hours before bedtime to minimize the effect pain may have on sleep.  3. Do not cross legs. Do not bend past 90 .    Good Health Practices  1. Maintain an adequate fluid intake and eat a well balanced diet.  2. Be sure to include the basic food groups, such as dairy products, meat/fish, vegetables, and fruit. Each of these foods contribute to wound healing and increasing your strength.  3. Surgery, decreased activity and pain medication all contribute to a descrease in bowel activity that can result in constipation. It is recommended that you increase your liquid intake, add fiber to your diet, increase activity, and decrease pain medication use. If you have any problems, notify your physician.  4. Notify your dentist of your total hip surgery and call your dentist one week before a dental appointment for antibiotics.  If dentist will not prescribe antibiotics call your surgeon to ask on next steps.      Incision/Dressing Care  1. Keep incision clean and dry.  2. Cover incision if you are still having drainage.  3.  If you have a waterproof dressing _____________________   Shower.    Things to Watch For  1. Check incision daily for increased redness, tenderness, swelling, or drainage along the incision line. If these occur, please notify your doctor. Also, call if you develop a fever above 101 .  2. Please notify your doctor if you experience " any calf pain and/or if you have surgical pain not relieved by the pain medication prescribed by your doctor.

## 2017-09-23 NOTE — PLAN OF CARE
Problem: Patient Care Overview  Goal: Plan of Care/Patient Progress Review  Discharge Planner OT   Patient plan for discharge: home with spouse to assist  Current status: Delisa tub transfer using DME, pt has shower seat and suction cup grab bar available at home; CGA ambulation in room with 2WW; toilet transfer SBA; LE dressing SBA with AE  Barriers to return to prior living situation: none  Recommendations for discharge: home with spouse to assist  Rationale for recommendations: spouse will be able to assist with level of care needed at home       Entered by: ALBER MORELAND 09/23/2017 10:22 AM       Occupational Therapy Discharge Summary    Reason for therapy discharge:    Discharged to home.    Progress towards therapy goal(s). See goals on Care Plan in [facility] electronic health record for goal details.  Goals met    Therapy recommendation(s):    No further therapy is recommended.

## 2017-09-26 NOTE — DISCHARGE SUMMARY
[facility] Discharge Summary    Jayshree Hastings MRN# [id number]   Age: 66 year old YOB: 1950     Date of Admission:  9/20/2017  Date of Discharge::  9/23/2017  3:05 PM  Admitting Physician:  Chacho Cates MD  Discharge Physician:  Chacho Cates MD     Home clinic: [address]  [facility]          Admission Diagnoses:   djd  H/O total hip arthroplasty, right          Discharge Diagnosis:   djd  H/O total hip arthroplasty, right          Procedures:   Procedure(s): Total hip arthoplasty (Right)       No procedures performed during this admission           Medications Prior to Admission:     No prescriptions prior to admission.             Discharge Medications:     Discharge Medication List as of 9/23/2017 11:09 AM      START taking these medications    Details   oxyCODONE (ROXICODONE) 5 MG IR tablet Take 1 tablet (5 mg) by mouth every 6 hours as needed for moderate to severe pain, Disp-60 tablet, R-0, Local Print      acetaminophen (TYLENOL) 325 MG tablet Take 3 tablets (975 mg) by mouth every 8 hours, Disp-100 tablet, R-0, OTC      enoxaparin (LOVENOX) 40 MG/0.4ML injection Inject 0.4 mLs (40 mg) Subcutaneous every 24 hours, Disp-6 Syringe, R-0, E-Prescribe      ferrous gluconate (FERGON) 324 (38 FE) MG tablet Take 1 tablet (324 mg) by mouth daily, Disp-60 tablet, R-0, E-Prescribe      senna-docusate (SENOKOT-S;PERICOLACE) 8.6-50 MG per tablet Take 1-2 tablets by mouth 2 times daily, Disp-60 tablet, R-0, E-Prescribe         CONTINUE these medications which have CHANGED    Details   aspirin 325 MG tablet 325 mg  Bid with food  For 1  Week then  325 mg/day  For  2 more  Weeks  With food, Disp-90 tablet, R-0, OTC         CONTINUE these medications which have NOT CHANGED    Details   IBUPROFEN PO Take 200 mg by mouth daily as needed for moderate pain, Historical      Albuterol Sulfate (PROAIR HFA IN) Inhale 1-2 puffs into the lungs every 4 hours as needed (chest  tightness/wheezing), Historical      HYDROCHLOROTHIAZIDE PO Take 12.5 mg by mouth daily as needed , Historical      LISINOPRIL PO Take 40 mg by mouth daily, Historical      LEVOTHYROXINE SODIUM PO Take 100 mcg by mouth daily, Historical      fluticasone-salmeterol (ADVAIR HFA) 45-21 MCG/ACT inhaler Inhale 1 puff into the lungs daily as needed , Historical      Prenatal Vit-Fe Fumarate-FA (PRENATAL MULTIVITAMIN PLUS IRON) 27-0.8 MG TABS per tablet Take 1 tablet by mouth daily, Historical      VITAMIN D, CHOLECALCIFEROL, PO Take 1,000 Units by mouth daily, Historical                   Consultations:   No consultations were requested during this admission          Brief History of Illness:   This patient was a 66 year old female with a known history of osteoarthritis.  She underwent a period of non-operative treatment which only provided mild and intermittent relief.  After a lengthy discussion and consultation with Dr. maldonado, the patient chose to undergo a right side hip arthroplasty.  She was explained the risks,complications, and benefits of the procedure and elected to have the surgical procedure.  Patient was cleared by her primary care physician for joint replacement.           Hospital Course:   The patient tolerated the procedure well and was taken to postop recovery in stable condition.  Please refer to the full operative note for complete details.   In recovery, she had a post-operative hemoglobin of see  epic and was noted to be motor and neurovascular intact.  Post-operative films show components in excellent position.     On post-operative day 1, patient's wound was checked and noted to be healing well..  The drain output was within normal limits..  Patient had adequate pain control and was prescribed physical therapy.  She was given 24 hrs of perioperative antibiotics.  Patient had motor strength of 5/5 on the both sides.  Patient was neurovascularly intact in the both sides lower extremity. There were  no complications throughout the hospital course as the patient passed physical therapy/occupational therapy on post-operative day #1.  The drain was pulled on post-operative day #1.  Her discharge hemoglobin was see epic and the patient did not require a blood transfusion.  She was followed by joel during this hospital visit to manage her medical problems.     Upon discharge, the patient was seen by Dr. maldonado and all questions were answered.  She was discharged on home medications as outlined in medication reconciliation list outlined below.  The patient has instructions that if she has increased pain, fever, erythema, swelling or drainage to immediately call.          Discharge Instructions and Follow-Up:   Discharge diet: Regular   Discharge activity: Activity as tolerated   Discharge follow-up: Follow up with me in 2 weeks   Wound care: Apply bandage daily  Keep wound clean and dry  May get incision wet in shower but do not soak or scrub           Discharge Disposition:   Discharged to home      Attestation:  I have reviewed today's vital signs, notes, medications, labs and imaging.  Amount of time performed on this discharge summary: 15 minutes.  Home pt  Also  Set up    Chacho Maldonado MD

## 2018-11-16 ENCOUNTER — HOSPITAL ENCOUNTER (EMERGENCY)
Facility: CLINIC | Age: 68
Discharge: HOME | End: 2018-11-16
Attending: EMERGENCY MEDICINE | Admitting: EMERGENCY MEDICINE
Payer: MEDICARE

## 2018-11-16 ENCOUNTER — APPOINTMENT (OUTPATIENT)
Dept: CT IMAGING | Facility: CLINIC | Age: 68
End: 2018-11-16
Attending: EMERGENCY MEDICINE
Payer: MEDICARE

## 2018-11-16 VITALS
WEIGHT: 190 LBS | TEMPERATURE: 98.4 F | RESPIRATION RATE: 16 BRPM | DIASTOLIC BLOOD PRESSURE: 96 MMHG | HEIGHT: 64 IN | SYSTOLIC BLOOD PRESSURE: 167 MMHG | HEART RATE: 72 BPM | OXYGEN SATURATION: 96 % | BODY MASS INDEX: 32.44 KG/M2

## 2018-11-16 DIAGNOSIS — I10 ESSENTIAL HYPERTENSION: ICD-10-CM

## 2018-11-16 LAB
ANION GAP SERPL CALCULATED.3IONS-SCNC: 4 MMOL/L (ref 3–14)
BASOPHILS # BLD AUTO: 0 10E9/L (ref 0–0.2)
BASOPHILS NFR BLD AUTO: 0.3 %
BUN SERPL-MCNC: 12 MG/DL (ref 7–30)
CALCIUM SERPL-MCNC: 8.5 MG/DL (ref 8.5–10.1)
CHLORIDE SERPL-SCNC: 105 MMOL/L (ref 94–109)
CO2 SERPL-SCNC: 28 MMOL/L (ref 20–32)
CREAT SERPL-MCNC: 0.79 MG/DL (ref 0.52–1.04)
DIFFERENTIAL METHOD BLD: NORMAL
EOSINOPHIL # BLD AUTO: 0.1 10E9/L (ref 0–0.7)
EOSINOPHIL NFR BLD AUTO: 2.2 %
ERYTHROCYTE [DISTWIDTH] IN BLOOD BY AUTOMATED COUNT: 14.6 % (ref 10–15)
GFR SERPL CREATININE-BSD FRML MDRD: 72 ML/MIN/1.7M2
GLUCOSE SERPL-MCNC: 113 MG/DL (ref 70–99)
HCT VFR BLD AUTO: 42.8 % (ref 35–47)
HGB BLD-MCNC: 13.8 G/DL (ref 11.7–15.7)
IMM GRANULOCYTES # BLD: 0 10E9/L (ref 0–0.4)
IMM GRANULOCYTES NFR BLD: 0.3 %
INTERPRETATION ECG - MUSE: NORMAL
LYMPHOCYTES # BLD AUTO: 1.5 10E9/L (ref 0.8–5.3)
LYMPHOCYTES NFR BLD AUTO: 22.6 %
MCH RBC QN AUTO: 28.6 PG (ref 26.5–33)
MCHC RBC AUTO-ENTMCNC: 32.2 G/DL (ref 31.5–36.5)
MCV RBC AUTO: 89 FL (ref 78–100)
MONOCYTES # BLD AUTO: 0.4 10E9/L (ref 0–1.3)
MONOCYTES NFR BLD AUTO: 6.5 %
NEUTROPHILS # BLD AUTO: 4.4 10E9/L (ref 1.6–8.3)
NEUTROPHILS NFR BLD AUTO: 68.1 %
PLATELET # BLD AUTO: 254 10E9/L (ref 150–450)
POTASSIUM SERPL-SCNC: 4.1 MMOL/L (ref 3.4–5.3)
RBC # BLD AUTO: 4.82 10E12/L (ref 3.8–5.2)
SODIUM SERPL-SCNC: 137 MMOL/L (ref 133–144)
TROPONIN I SERPL-MCNC: <0.015 UG/L (ref 0–0.04)
WBC # BLD AUTO: 6.5 10E9/L (ref 4–11)

## 2018-11-16 PROCEDURE — 84484 ASSAY OF TROPONIN QUANT: CPT | Performed by: EMERGENCY MEDICINE

## 2018-11-16 PROCEDURE — 70450 CT HEAD/BRAIN W/O DYE: CPT

## 2018-11-16 PROCEDURE — 80048 BASIC METABOLIC PNL TOTAL CA: CPT | Performed by: EMERGENCY MEDICINE

## 2018-11-16 PROCEDURE — 85025 COMPLETE CBC W/AUTO DIFF WBC: CPT | Performed by: EMERGENCY MEDICINE

## 2018-11-16 PROCEDURE — 99285 EMERGENCY DEPT VISIT HI MDM: CPT | Mod: 25

## 2018-11-16 PROCEDURE — 93005 ELECTROCARDIOGRAM TRACING: CPT

## 2018-11-16 RX ORDER — LABETALOL HYDROCHLORIDE 5 MG/ML
20 INJECTION, SOLUTION INTRAVENOUS ONCE
Status: DISCONTINUED | OUTPATIENT
Start: 2018-11-16 | End: 2018-11-16 | Stop reason: HOSPADM

## 2018-11-16 ASSESSMENT — ENCOUNTER SYMPTOMS
FEVER: 0
SHORTNESS OF BREATH: 0
NUMBNESS: 0
DIZZINESS: 1
HEADACHES: 0

## 2018-11-16 NOTE — ED AVS SNAPSHOT
Emergency Department    [address]    [place] [address]    Phone:  [phone]    Fax:  [phone]                                       Jayshree Hastings   MRN: [id number]    Department:   Emergency Department   Date of Visit:  11/16/2018           Patient Information     Date Of Birth          1950        Your diagnoses for this visit were:     Essential hypertension        You were seen by Sue Schmidt MD.      Follow-up Information     Follow up with Kisha Diego PA-C In 3 days.    Specialty:  Physician Assistant    Why:  Document your blood pressures once a day and see your provider on Monday for a recheck    Contact information:    [address] [facility] [address]  [facility] [address] [phone]        Discharge References/Attachments     HYPERTENSION, ESTABLISHED (ENGLISH)      24 Hour Appointment Hotline       To make an appointment at any [facility], call [phone] ([phone]). If you don't have a family doctor or clinic, we will help you find one. [facility] are conveniently located to serve the needs of you and your family.             Review of your medicines      Our records show that you are taking the medicines listed below. If these are incorrect, please call your family doctor or clinic.        Dose / Directions Last dose taken    acetaminophen 325 MG tablet   Commonly known as:  TYLENOL   Dose:  975 mg   Quantity:  100 tablet        Take 3 tablets (975 mg) by mouth every 8 hours   Refills:  0        ADVAIR HFA 45-21 MCG/ACT inhaler   Dose:  1 puff   Generic drug:  fluticasone-salmeterol        Inhale 1 puff into the lungs daily as needed   Refills:  0        aspirin 325 MG tablet   Quantity:  90 tablet        325 mg  Bid with food  For 1  Week then  325 mg/day  For  2 more  Weeks  With food   Refills:  0        enoxaparin 40 MG/0.4ML injection   Commonly known as:  LOVENOX   Dose:  40 mg   Quantity:  6 Syringe        Inject 0.4 mLs (40 mg)  Subcutaneous every 24 hours   Refills:  0        ferrous gluconate 324 (38 Fe) MG tablet   Commonly known as:  FERGON   Dose:  324 mg   Quantity:  60 tablet        Take 1 tablet (324 mg) by mouth daily   Refills:  0        HYDROCHLOROTHIAZIDE PO   Dose:  12.5 mg        Take 12.5 mg by mouth daily as needed   Refills:  0        IBUPROFEN PO   Dose:  200 mg        Take 200 mg by mouth daily as needed for moderate pain   Refills:  0        LEVOTHYROXINE SODIUM PO   Dose:  100 mcg        Take 100 mcg by mouth daily   Refills:  0        LISINOPRIL PO   Dose:  40 mg        Take 40 mg by mouth daily   Refills:  0        oxyCODONE IR 5 MG tablet   Commonly known as:  ROXICODONE   Dose:  5 mg   Quantity:  60 tablet        Take 1 tablet (5 mg) by mouth every 6 hours as needed for moderate to severe pain   Refills:  0        prenatal multivitamin plus iron 27-0.8 MG Tabs per tablet   Dose:  1 tablet        Take 1 tablet by mouth daily   Refills:  0        PROAIR HFA IN   Dose:  1-2 puff        Inhale 1-2 puffs into the lungs every 4 hours as needed (chest tightness/wheezing)   Refills:  0        senna-docusate 8.6-50 MG per tablet   Commonly known as:  SENOKOT-S;PERICOLACE   Dose:  1-2 tablet   Quantity:  60 tablet        Take 1-2 tablets by mouth 2 times daily   Refills:  0        VITAMIN D (CHOLECALCIFEROL) PO   Dose:  1000 Units        Take 1,000 Units by mouth daily   Refills:  0                Procedures and tests performed during your visit     Basic metabolic panel    CBC with platelets differential    CT Head w/o Contrast    EKG 12-lead, tracing only    Troponin I      Orders Needing Specimen Collection     None      Pending Results     No orders found from 11/14/2018 to 11/17/2018.            Pending Culture Results     No orders found from 11/14/2018 to 11/17/2018.            Pending Results Instructions     If you had any lab results that were not finalized at the time of your Discharge, you can call the ED Lab  Result RN at [phone]. You will be contacted by this team for any positive Lab results or changes in treatment. The nurses are available 7 days a week from 10A to 6:30P.  You can leave a message 24 hours per day and they will return your call.        Test Results From Your Hospital Stay        11/16/2018 12:15 PM      Component Results     Component Value Ref Range & Units Status    WBC 6.5 4.0 - 11.0 10e9/L Final    RBC Count 4.82 3.8 - 5.2 10e12/L Final    Hemoglobin 13.8 11.7 - 15.7 g/dL Final    Hematocrit 42.8 35.0 - 47.0 % Final    MCV 89 78 - 100 fl Final    MCH 28.6 26.5 - 33.0 pg Final    MCHC 32.2 31.5 - 36.5 g/dL Final    RDW 14.6 10.0 - 15.0 % Final    Platelet Count 254 150 - 450 10e9/L Final    Diff Method Automated Method  Final    % Neutrophils 68.1 % Final    % Lymphocytes 22.6 % Final    % Monocytes 6.5 % Final    % Eosinophils 2.2 % Final    % Basophils 0.3 % Final    % Immature Granulocytes 0.3 % Final    Absolute Neutrophil 4.4 1.6 - 8.3 10e9/L Final    Absolute Lymphocytes 1.5 0.8 - 5.3 10e9/L Final    Absolute Monocytes 0.4 0.0 - 1.3 10e9/L Final    Absolute Eosinophils 0.1 0.0 - 0.7 10e9/L Final    Absolute Basophils 0.0 0.0 - 0.2 10e9/L Final    Abs Immature Granulocytes 0.0 0 - 0.4 10e9/L Final         11/16/2018 12:33 PM      Component Results     Component Value Ref Range & Units Status    Sodium 137 133 - 144 mmol/L Final    Potassium 4.1 3.4 - 5.3 mmol/L Final    Chloride 105 94 - 109 mmol/L Final    Carbon Dioxide 28 20 - 32 mmol/L Final    Anion Gap 4 3 - 14 mmol/L Final    Glucose 113 (H) 70 - 99 mg/dL Final    Urea Nitrogen 12 7 - 30 mg/dL Final    Creatinine 0.79 0.52 - 1.04 mg/dL Final    GFR Estimate 72 >60 mL/min/1.7m2 Final    Non  GFR Calc    GFR Estimate If Black 87 >60 mL/min/1.7m2 Final    African American GFR Calc    Calcium 8.5 8.5 - 10.1 mg/dL Final         11/16/2018 12:37 PM      Component Results     Component Value Ref Range & Units Status     Troponin I ES <0.015 0.000 - 0.045 ug/L Final    The 99th percentile for upper reference range is 0.045 ug/L.  Troponin values   in the range of 0.045 - 0.120 ug/L may be associated with risks of adverse   clinical events.           11/16/2018  1:54 PM      Narrative     CT SCAN OF THE HEAD WITHOUT CONTRAST   11/16/2018 1:26 PM     HISTORY: Hypertension, dizzy.     TECHNIQUE:  Axial images of the head and coronal reformations without  IV contrast material. Radiation dose for this scan was reduced using  automated exposure control, adjustment of the mA and/or kV according  to patient size, or iterative reconstruction technique.    COMPARISON: None.    FINDINGS: There is no evidence of intracranial hemorrhage, mass, acute  infarct or anomaly. The ventricles are normal in size, shape and  configuration. The brain parenchyma and subarachnoid spaces are  normal.     The visualized portions of the sinuses and mastoids appear normal. The  bony calvarium and bones of the skull base appear intact.         Impression     IMPRESSION:   No evidence of acute intracranial hemorrhage, mass, or  herniation.    GIANNA ZHOU MD                Clinical Quality Measure: Blood Pressure Screening     Your blood pressure was checked while you were in the emergency department today. The last reading we obtained was  BP: (!) 165/118 . Please read the guidelines below about what these numbers mean and what you should do about them.  If your systolic blood pressure (the top number) is less than 120 and your diastolic blood pressure (the bottom number) is less than 80, then your blood pressure is normal. There is nothing more that you need to do about it.  If your systolic blood pressure (the top number) is 120-139 or your diastolic blood pressure (the bottom number) is 80-89, your blood pressure may be higher than it should be. You should have your blood pressure rechecked within a year by a primary care provider.  If your systolic blood  "pressure (the top number) is 140 or greater or your diastolic blood pressure (the bottom number) is 90 or greater, you may have high blood pressure. High blood pressure is treatable, but if left untreated over time it can put you at risk for heart attack, stroke, or kidney failure. You should have your blood pressure rechecked by a primary care provider within the next 4 weeks.  If your provider in the emergency department today gave you specific instructions to follow-up with your doctor or provider even sooner than that, you should follow that instruction and not wait for up to 4 weeks for your follow-up visit.        Thank you for choosing [facility]       Thank you for choosing [facility] for your care. Our goal is always to provide you with excellent care. Hearing back from our patients is one way we can continue to improve our services. Please take a few minutes to complete the written survey that you may receive in the mail after you visit with us. Thank you!        [employer] Information     [employer] lets you send messages to your doctor, view your test results, renew your prescriptions, schedule appointments and more. To sign up, go to [email or web address] . Click on \"Log in\" on the left side of the screen, which will take you to the Welcome page. Then click on \"Sign up Now\" on the right side of the page.     You will be asked to enter the access code listed below, as well as some personal information. Please follow the directions to create your username and password.     Your access code is: [email or web address]  Expires: 2019  1:57 PM     Your access code will  in 90 days. If you need help or a new code, please call your [facility] clinic or [phone].        Care EveryWhere ID     This is your Care EveryWhere ID. This could be used by other organizations to access your [facility] medical records  [id number]        Equal Access to Services     MAKI BEST : jose Ruiz, " lakesha fortune ah. So [facility] [phone].    ATENCIÓN: Si habla jannyañol, tiene a lopez disposición servicios gratuitos de asistencia lingüística. Llame al [phone].    We comply with applicable federal civil rights laws and [place] laws. We do not discriminate on the basis of race, color, national origin, age, disability, sex, sexual orientation, or gender identity.            After Visit Summary       This is your record. Keep this with you and show to your community pharmacist(s) and doctor(s) at your next visit.

## 2018-11-16 NOTE — ED AVS SNAPSHOT
Emergency Department    [address] [facility] [address]    Phone:  [phone]    Fax:  [phone]                                       Jayshree Hastings   MRN: [id number]    Department:   Emergency Department   Date of Visit:  11/16/2018           After Visit Summary Signature Page     I have received my discharge instructions, and my questions have been answered. I have discussed any challenges I see with this plan with the nurse or doctor.    ..........................................................................................................................................  Patient/Patient Representative Signature      ..........................................................................................................................................  Patient Representative Print Name and Relationship to Patient    ..................................................               ................................................  Date                                   Time    ..........................................................................................................................................  Reviewed by Signature/Title    ...................................................              ..............................................  Date                                               Time          22EPIC Rev 08/18

## 2018-11-16 NOTE — ED PROVIDER NOTES
History     Chief Complaint:  Hypertension and Dizziness    HPI   Jayshree Hastings is a 68 year old female who presents with hypertension and dizziness. Patient has a history of high blood pressure and she reports taking her medication this morning (lisinopril 40 mg). She denies any changes in her medication. She states that she noticed some dizziness yesterday and took her blood pressure and reports it was 200s systolic. This morning she had dizziness again and took her blood pressure multiple times and reports it being high. She has chronic sinus congestion. She denies any vision changes, numbness, tingling, headache, and shortness of breath. She has some slight chest discomfort but denies any chest pain. She denies any recent illness.  She is to be on hydrochlorothiazide however she requested to be taken off of this that she did not like the frequent urination that it produced.  No other medication was added on after this 1 was stopped.    Allergies:  No known drug allergies.    Medications:    Acetaminophen (tylenol) 325 mg tablet  Albuterol sulfate (proair hfa in)  Aspirin 325 mg tablet  Enoxaparin (lovenox) 40 mg/0.4ml injection  Ferrous gluconate (fergon) 324 (38 fe) mg tablet  Fluticasone-salmeterol (advair hfa) 45-21 mcg/act inhaler  Hydrochlorothiazide po  Ibuprofen po  Levothyroxine sodium po  Lisinopril po  Oxycodone (roxicodone) 5 mg ir tablet  Prenatal vit-fe fumarate-fa (prenatal multivitamin plus iron) 27-0.8 mg tabs per tablet  Senna-docusate (senokot-s;pericolace) 8.6-50 mg per tablet  Vitamin d, cholecalciferol, po     Past Medical History:    Allergic rhinitis  Asthma  Hypertension  Hypothyroid  Total hip arthroplasty, right    Past Surgical History:    Arthroplasty hip  [place] teeth     Family History:    History reviewed. No pertinent family history.    Social History:  Patient is   Tobacco Use: Current smoker  Alcohol Use: Yes  PCP: Kisha Diego     Review of Systems  "  Constitutional: Negative for fever.   HENT: Positive for congestion.    Eyes: Negative for visual disturbance.   Respiratory: Negative for shortness of breath.    Neurological: Positive for dizziness. Negative for numbness and headaches.        No tingling   All other systems reviewed and are negative.      Physical Exam   First Vitals:  Patient Vitals for the past 24 hrs:   BP Temp Temp src Pulse Heart Rate Resp SpO2 Height Weight   11/16/18 1408 - - - - - 16 - - -   11/16/18 1345 (!) 167/96 - - - 64 13 - - -   11/16/18 1330 - - - - 70 16 96 % - -   11/16/18 1300 (!) 165/118 - - - 67 19 96 % - -   11/16/18 1245 (!) 148/102 - - - 65 11 97 % - -   11/16/18 1230 (!) 168/113 - - - 66 12 96 % - -   11/16/18 1215 (!) 144/99 - - - 69 10 96 % - -   11/16/18 1200 - - - - 78 19 97 % - -   11/16/18 1145 (!) 173/109 98.4  F (36.9  C) Temporal 72 72 14 100 % 1.626 m (5' 4\") 86.2 kg (190 lb)     Physical Exam    Physical Exam   Constitutional:  Patient is oriented to person, place, and time. They appear well-developed and well-nourished. Mild distress secondary to dizziness.   HENT:   Mouth/Throat:   Oropharynx is clear and moist.   Eyes:    Conjunctivae normal and EOM are normal. Pupils are equal, round, and reactive to light.   Neck:    Normal range of motion.   Cardiovascular: Normal rate, regular rhythm and normal heart sounds.  Exam reveals no gallop and no friction rub.  No murmur heard.  Pulmonary/Chest:  Effort normal and breath sounds normal. Patient has no wheezes. Patient has no rales.   Abdominal:   Soft. Bowel sounds are normal. Patient exhibits no mass. There is no tenderness. There is no rebound and no guarding.   Musculoskeletal:  Normal range of motion. Patient exhibits no edema.   Neurological:   Patient is alert and oriented to person, place, and time. Patient has normal strength. No cranial nerve deficit or sensory deficit. GCS 15  Skin:   Skin is warm and dry. No rash noted. No erythema.   Psychiatric: "   Patient has a normal mood and affect. Patient's behavior is normal. Judgment and thought content normal.     Emergency Department Course   ECG:  @ 1207  Indication: Dizziness  Vent. Rate 69 bpm. NV interval 148 ms. QRS duration 84 ms. QT/QTc 392/420 ms. P-R-T axis 74 30 51.   Normal sinus rhythm. Normal ECG.     Read @ 1215 by Dr. Schmidt.    Imaging:  Radiographic findings were communicated with the patient who voiced understanding of the findings.  CT head w/o contrast:  No evidence of acute intracranial hemorrhage, mass, or  Herniation per Radiology read.    Laboratory:  CBC:  WBC 6.5, HGB 13.8,   BMP: Glucose 113 high, o/w WNL. (Creatinine 0.79)  Troponin: <0.015    Emergency Department Course:  Nursing notes and vitals reviewed.  I performed an exam of the patient as documented above.   1:47 PM I consulted with the nurse Sivan from Dr Diego's office ([facility] office) regarding the patient.  Findings and plan explained to the patient. Patient discharged home with instructions regarding supportive care, medications, and reasons to return. The importance of close follow-up was reviewed.       Impression & Plan      Medical Decision Making:  Jayshree Hastings is a 68 year old female who presents to the emergency department with concerns for hypertension.  She does have a history of this and did discontinue out of her medications and did not supplement that with another medications, her primary is aware of this.  Her EKG here shows no evidence of ischemia or arrhythmia.  Her troponin is within normal limits.  CT of her head was performed as she she complained of some vague intermittent dizziness, however no headache numbness or tingling.  CAT scan shows no acute findings.  Her cardiac enzyme is within normal limits, she has no evidence of renal failure.  Her blood pressures were trended here they did come down, and she had no further symptoms.    I did speak with the primary care doctor's office  her primary care is out of the office but I was able to speak with the nurse.  I reviewed with the nurse the patient's complaints today and her blood pressures here.  At this point they are recommending that she trend her blood pressures over the weekend, follow-up with Melissa Hunter next week, and they will make adjustments or additions to the blood pressure medications at that time based on what the blood pressure reads are.  The patient was advised of this recommendation she does feel comfortable with this plan.  At the time of discharge she was asymptomatic.      Diagnosis:    ICD-10-CM    1. Essential hypertension I10        Disposition:  discharged to home    I, Bradley Aasen, am serving as a scribe on 11/16/2018 at 11:46 AM to personally document services performed by Sue Schmidt MD based on my observations and the provider's statements to me.        Sue Schmidt MD  11/16/18 [id number]

## 2018-11-20 ENCOUNTER — HOSPITAL ENCOUNTER (EMERGENCY)
Facility: CLINIC | Age: 68
Discharge: HOME | End: 2018-11-20
Attending: EMERGENCY MEDICINE | Admitting: EMERGENCY MEDICINE
Payer: MEDICARE

## 2018-11-20 VITALS
BODY MASS INDEX: 32.61 KG/M2 | OXYGEN SATURATION: 98 % | RESPIRATION RATE: 16 BRPM | HEART RATE: 71 BPM | WEIGHT: 190 LBS | SYSTOLIC BLOOD PRESSURE: 139 MMHG | DIASTOLIC BLOOD PRESSURE: 87 MMHG | TEMPERATURE: 97.7 F

## 2018-11-20 DIAGNOSIS — R00.2 PALPITATIONS: ICD-10-CM

## 2018-11-20 LAB
ALBUMIN SERPL-MCNC: 3.5 G/DL (ref 3.4–5)
ALBUMIN UR-MCNC: NEGATIVE MG/DL
ALP SERPL-CCNC: 114 U/L (ref 40–150)
ALT SERPL W P-5'-P-CCNC: 18 U/L (ref 0–50)
ANION GAP SERPL CALCULATED.3IONS-SCNC: 8 MMOL/L (ref 3–14)
APPEARANCE UR: CLEAR
AST SERPL W P-5'-P-CCNC: 13 U/L (ref 0–45)
BASOPHILS # BLD AUTO: 0 10E9/L (ref 0–0.2)
BASOPHILS NFR BLD AUTO: 0.4 %
BILIRUB SERPL-MCNC: 0.4 MG/DL (ref 0.2–1.3)
BILIRUB UR QL STRIP: NEGATIVE
BUN SERPL-MCNC: 10 MG/DL (ref 7–30)
CALCIUM SERPL-MCNC: 8.6 MG/DL (ref 8.5–10.1)
CHLORIDE SERPL-SCNC: 103 MMOL/L (ref 94–109)
CO2 SERPL-SCNC: 26 MMOL/L (ref 20–32)
COLOR UR AUTO: ABNORMAL
CREAT SERPL-MCNC: 1.09 MG/DL (ref 0.52–1.04)
DIFFERENTIAL METHOD BLD: NORMAL
EOSINOPHIL # BLD AUTO: 0.1 10E9/L (ref 0–0.7)
EOSINOPHIL NFR BLD AUTO: 1.9 %
ERYTHROCYTE [DISTWIDTH] IN BLOOD BY AUTOMATED COUNT: 14.4 % (ref 10–15)
GFR SERPL CREATININE-BSD FRML MDRD: 50 ML/MIN/1.7M2
GLUCOSE SERPL-MCNC: 94 MG/DL (ref 70–99)
GLUCOSE UR STRIP-MCNC: NEGATIVE MG/DL
HCT VFR BLD AUTO: 40.1 % (ref 35–47)
HGB BLD-MCNC: 13.4 G/DL (ref 11.7–15.7)
HGB UR QL STRIP: NEGATIVE
IMM GRANULOCYTES # BLD: 0 10E9/L (ref 0–0.4)
IMM GRANULOCYTES NFR BLD: 0 %
INTERPRETATION ECG - MUSE: NORMAL
KETONES UR STRIP-MCNC: NEGATIVE MG/DL
LEUKOCYTE ESTERASE UR QL STRIP: NEGATIVE
LYMPHOCYTES # BLD AUTO: 1.2 10E9/L (ref 0.8–5.3)
LYMPHOCYTES NFR BLD AUTO: 20.2 %
MCH RBC QN AUTO: 29.7 PG (ref 26.5–33)
MCHC RBC AUTO-ENTMCNC: 33.4 G/DL (ref 31.5–36.5)
MCV RBC AUTO: 89 FL (ref 78–100)
MONOCYTES # BLD AUTO: 0.4 10E9/L (ref 0–1.3)
MONOCYTES NFR BLD AUTO: 7.6 %
NEUTROPHILS # BLD AUTO: 4 10E9/L (ref 1.6–8.3)
NEUTROPHILS NFR BLD AUTO: 69.9 %
NITRATE UR QL: NEGATIVE
NRBC # BLD AUTO: 0 10*3/UL
NRBC BLD AUTO-RTO: 0 /100
PH UR STRIP: 6.5 PH (ref 5–7)
PLATELET # BLD AUTO: 235 10E9/L (ref 150–450)
POTASSIUM SERPL-SCNC: 4.2 MMOL/L (ref 3.4–5.3)
PROT SERPL-MCNC: 7.3 G/DL (ref 6.8–8.8)
RBC # BLD AUTO: 4.51 10E12/L (ref 3.8–5.2)
SODIUM SERPL-SCNC: 137 MMOL/L (ref 133–144)
SOURCE: ABNORMAL
SP GR UR STRIP: 1 (ref 1–1.03)
TSH SERPL DL<=0.005 MIU/L-ACNC: 2.66 MU/L (ref 0.4–4)
UROBILINOGEN UR STRIP-MCNC: NORMAL MG/DL (ref 0–2)
WBC # BLD AUTO: 5.7 10E9/L (ref 4–11)

## 2018-11-20 PROCEDURE — 84443 ASSAY THYROID STIM HORMONE: CPT | Performed by: EMERGENCY MEDICINE

## 2018-11-20 PROCEDURE — 85025 COMPLETE CBC W/AUTO DIFF WBC: CPT | Performed by: EMERGENCY MEDICINE

## 2018-11-20 PROCEDURE — 99284 EMERGENCY DEPT VISIT MOD MDM: CPT | Mod: 25

## 2018-11-20 PROCEDURE — 93005 ELECTROCARDIOGRAM TRACING: CPT

## 2018-11-20 PROCEDURE — 80053 COMPREHEN METABOLIC PANEL: CPT | Performed by: EMERGENCY MEDICINE

## 2018-11-20 PROCEDURE — 81003 URINALYSIS AUTO W/O SCOPE: CPT | Performed by: EMERGENCY MEDICINE

## 2018-11-20 PROCEDURE — 96360 HYDRATION IV INFUSION INIT: CPT

## 2018-11-20 PROCEDURE — 25000128 H RX IP 250 OP 636: Performed by: EMERGENCY MEDICINE

## 2018-11-20 RX ADMIN — SODIUM CHLORIDE, POTASSIUM CHLORIDE, SODIUM LACTATE AND CALCIUM CHLORIDE 1000 ML: 600; 310; 30; 20 INJECTION, SOLUTION INTRAVENOUS at 13:10

## 2018-11-20 ASSESSMENT — ENCOUNTER SYMPTOMS
SHORTNESS OF BREATH: 0
PALPITATIONS: 1
NERVOUS/ANXIOUS: 1
APPETITE CHANGE: 0
LIGHT-HEADEDNESS: 1

## 2018-11-20 NOTE — ED PROVIDER NOTES
"  History     Chief Complaint:  Palpitations     HPI   Jayshree Hastings is a 68 year old female with a history of hypertension and hypothyroidism who presents to the emergency department for evaluation of palpitations. The patient presented to the emergency department four days ago for hypertension (see below). She last had palpitations five days ago, prior to her initial presentation. She thought this was anxiety related, and reports that she took Tylenol and they dissipated--this is what she usually does when this occurs. However, on 11/16 in the morning, she had them again and presented as noted above.  Now, she endorses hypertension alongside palpitations and \"fluttering\"--she felt like she would pass out and was lightheaded, she states. The patient took an Aspirin, as she thought it was indicated. Additionally, she took hydrochlorothiazide today--she had initially stopped taking it, but thought that her provider would add it back anyway. Moreover, she endorses sinus pressure and some lingering anxiety, likely related to familial concerns. She denies chest pain, leg swelling, syncope, loss of appetite, and trouble breathing.     Of note, she is currently on an antibiotic for a UTI (Bactrim). This was prescribed by her on-call doctor. She states that her urine now is pretty clear--she has been on it for three days. She denies any current urinary symptoms.     [facility] ED 11/16/2018  Seu Schmidt MD     ECG:  @ 1207  Indication: Dizziness  Vent. Rate 69 bpm. FL interval 148 ms. QRS duration 84 ms. QT/QTc 392/420 ms. P-R-T axis 74 30 51.   Normal sinus rhythm. Normal ECG.     Read @ 1215 by Dr. Schmidt.     Imaging:  Radiographic findings were communicated with the patient who voiced understanding of the findings.  CT head w/o contrast:  No evidence of acute intracranial hemorrhage, mass, or  Herniation per Radiology read.     Laboratory:  CBC:  WBC 6.5, HGB 13.8,   BMP: Glucose 113 high, " "o/w WNL. (Creatinine 0.79)  Troponin: <0.015    Allergies:  Amoxicillin      Medications:    Lovenox   Advair   Levothyroxine   Lisinopril   Bactrim     Past Medical History:    Allergic rhinitis  Asthma  Hypertension  Hypothyroid  Total hip arthroplasty, right    Past Surgical History:    Arthroplasty hip  [place] teeth     Family History:    History reviewed. No pertinent family history.     Social History:    Smoking Status: Current every day smoker 0.25 PPD   Smokeless Tobacco: Never  Alcohol Use: Yes   Marital Status:        Review of Systems   Constitutional: Negative for appetite change.   Respiratory: Negative for shortness of breath.    Cardiovascular: Positive for palpitations (\"fluttering with discomfort\"). Negative for chest pain and leg swelling.   Neurological: Positive for light-headedness. Negative for syncope.   Psychiatric/Behavioral: The patient is nervous/anxious.    All other systems reviewed and are negative.    Physical Exam     Patient Vitals for the past 24 hrs:   BP Temp Temp src Pulse Resp SpO2 Weight   11/20/18 1449 139/87 - - - - - -   11/20/18 1202 (!) 189/101 97.7  F (36.5  C) Oral 71 16 98 % 86.2 kg (190 lb)      Physical Exam  General: Well appearing, nontoxic.  Resting comfortably  Head:  Scalp, face, and head appear normal  Eyes:  Pupils are equal, round, and reactive to light    Conjunctivae non-injected and sclerae white  ENT:    The external nose is normal    Pinnae are normal    The oropharynx is normal, mucous membranes moist    Uvula is in the midline  Neck:  Normal range of motion    There is no rigidity noted    Trachea is in the midline  CV:  Regular rate and rhythm     Normal S1/S2, no S3/S4    No murmur or rub. Radial pulses 2+ bilaterally   Resp:  Lungs are clear and equal bilaterally    There is no tachypnea    No increased work of breathing    No rales, wheezing, or rhonchi  GI:  Abdomen is soft, no rigidity or guarding    No distension, or mass    No tenderness " or rebound tenderness   MS:  Normal muscular tone    Symmetric motor strength    No lower extremity edema. No leg swelling or tenderness.  Skin:  No rash or acute skin lesions noted  Neuro: Awake and alert    Speech is normal and fluent    Moves all extremities spontaneously  Psych: Normal affect.  Appropriate interactions.     Emergency Department Course     ECG:  Indication: Palpitations  Completed at 1211.  Read at [address].   Normal sinus rhythm   Possible left atrial enlargement   Borderline ECG    Rate 69 bpm. TX interval 142. QRS duration 86. QT/QTc 402/430. P-R-T axes 64 15 44.    Laboratory:  Laboratory findings were communicated with the patient and family who voiced understanding of the findings.    CBC: AWNL. (WBC 5.7, HGB 13.4, )   CMP: Creatinine 1.09 (H), GFR Estimate 50 (L) o/w WNL     TSH with free T4 reflex: 2.66     Interventions:  1310 - lactated ringers BOLUS 1000ml IV      Emergency Department Course:  Nursing notes and vitals reviewed.  EKG obtained in the ED, see results above.     1233: I performed an exam of the patient as documented above.     1517: Patient rechecked and updated.      Findings and plan explained to the Patient and spouse. Patient discharged home with instructions regarding supportive care, medications, and reasons to return. The importance of close follow-up was reviewed.   I personally reviewed the laboratory results with the Patient and spouse and answered all related questions prior to discharge.    Impression & Plan      Medical Decision Making:  Jayshree Hastings is a 68 year old female who presents for evaluation of palpitations. Patient recently evaluated in the ED on 11/16 for hypertension and dizziness with an unremarkable work up. Patient has since started hydrochlorothiazide and is logging her BP for PCP follow up. Today she is well appearing. Initial ECG and telemetry monitoring shows NSR.  A broad differential diagnosis was considered including SVT,  Atrial fibrillation, ventricular arrhythmia, thyroid disease, acute electrolyte abnormality,  drugs/medications, caffeine intake or other stimulants, medication side effect, anemia, heart disease, PE, etc. There is no evidence to suggest brugada, HOCM, prolonged QT, or other acute dysrhythmia.  The workup and exam here in ED are benign. Symptoms are not consistent with ACS, PE, infection or other acute life threatening or emergent process at this time. Discussed possibility of intermittent dysrhythmia but feel that she is low risk. I recommended close PCP follow up as if her symptoms continue may require further workup. Return precautions were discussed with patient. The patient's questions were answered and the patient was agreeable with discharge.       Diagnosis:    ICD-10-CM    1. Palpitations R00.2 UA reflex to Microscopic and Culture       Disposition:  Discharged to home.     Scribe Disclosure:  I, Renetta Rivera, am serving as a scribe at 12:23 PM on 11/20/2018 to document services personally performed by Bala Dillon MD based on my observations and the provider's statements to me.   11/20/2018    EMERGENCY DEPARTMENT       Bala Dillon MD  11/20/18 2046

## 2018-11-20 NOTE — ED AVS SNAPSHOT
Emergency Department    [address] [facility] [address]    Phone:  [phone]    Fax:  [phone]                                       Jayshree Hastings   MRN: [id number]    Department:   Emergency Department   Date of Visit:  11/20/2018           After Visit Summary Signature Page     I have received my discharge instructions, and my questions have been answered. I have discussed any challenges I see with this plan with the nurse or doctor.    ..........................................................................................................................................  Patient/Patient Representative Signature      ..........................................................................................................................................  Patient Representative Print Name and Relationship to Patient    ..................................................               ................................................  Date                                   Time    ..........................................................................................................................................  Reviewed by Signature/Title    ...................................................              ..............................................  Date                                               Time          22EPIC Rev 08/18

## 2018-11-20 NOTE — ED AVS SNAPSHOT
Emergency Department    [address] [facility] [address]    Phone:  [phone]    Fax:  [phone]                                       Jayshree Hastings   MRN: [id number]    Department:   Emergency Department   Date of Visit:  11/20/2018           Patient Information     Date Of Birth          1950        Your diagnoses for this visit were:     Palpitations        You were seen by Bala Dillon MD.      Follow-up Information     Follow up with Kisha Diego PA-C. Schedule an appointment as soon as possible for a visit in 2 days.    Specialty:  Physician Assistant    Why:  For close follow up    Contact information:    [address] [facility] [address]  [facility] [id number] [phone]          Discharge Instructions       Discharge Instructions  Palpitations    Palpitations are an unusual awareness of your heartbeat. People often describe this as the heart skipping, fluttering, racing, irregular, or pounding. At this time, your provider has found no signs that your palpitations are due to a serious or life-threatening condition. However, sometimes there is a serious problem that does not show up right away.    Palpitations can be caused by caffeine, cigarettes, diet pills, energy drinks or supplements, other stimulants, and medications and street drugs. They can also be caused by anxiety, hormone conditions such as high thyroid, and other medical conditions. Sometimes they are a sign of abnormal rhythm in the heart. At this time, your provider did not find any dangerous cause of your symptoms.    Generally, every Emergency Department visit should have a follow-up clinic visit with either a primary or a specialty clinic/provider. Please follow-up as instructed by your emergency provider today.    Return to the Emergency Department if:    You get chest pain or tightness.    You are short of breath.    You get very weak or tired.    You pass out or faint.    Your heart rate is over 120  beats per minute for more than 10 minutes while you are resting.     You have anything else that worries you.    What can I do to help myself?    Fill any prescriptions the provider gave you and take them right away.     Follow your provider s instructions about the prescription medicines you are on. Sometimes the provider may tell you to stop taking a medicine or change the dose.    If you smoke, this may be a good time to quit! The less you can smoke, the better.    Do not use energy drinks, diet pills, or stimulants. Limit your use of caffeine.  If you were given a prescription for medicine here today, be sure to read all of the information (including the package insert) that comes with your prescription.  This will include important information about the medicine, its side effects, and any warnings that you need to know about.  The pharmacist who fills the prescription can provide more information and answer questions you may have about the medicine.  If you have questions or concerns that the pharmacist cannot address, please call or return to the Emergency Department.     Remember that you can always come back to the Emergency Department if you are not able to see your regular provider in the amount of time listed above, if you get any new symptoms, or if there is anything that worries you.      24 Hour Appointment Hotline       To make an appointment at any [facility], call [phone] ([phone]). If you don't have a family doctor or clinic, we will help you find one. [facility] clinics are conveniently located to serve the needs of you and your family.             Review of your medicines      Our records show that you are taking the medicines listed below. If these are incorrect, please call your family doctor or clinic.        Dose / Directions Last dose taken    acetaminophen 325 MG tablet   Commonly known as:  TYLENOL   Dose:  975 mg   Quantity:  100 tablet        Take 3 tablets (975 mg) by  mouth every 8 hours   Refills:  0        ADVAIR HFA 45-21 MCG/ACT inhaler   Dose:  1 puff   Generic drug:  fluticasone-salmeterol        Inhale 1 puff into the lungs daily as needed   Refills:  0        aspirin 325 MG tablet   Quantity:  90 tablet        325 mg  Bid with food  For 1  Week then  325 mg/day  For  2 more  Weeks  With food   Refills:  0        enoxaparin 40 MG/0.4ML injection   Commonly known as:  LOVENOX   Dose:  40 mg   Quantity:  6 Syringe        Inject 0.4 mLs (40 mg) Subcutaneous every 24 hours   Refills:  0        ferrous gluconate 324 (38 Fe) MG tablet   Commonly known as:  FERGON   Dose:  324 mg   Quantity:  60 tablet        Take 1 tablet (324 mg) by mouth daily   Refills:  0        HYDROCHLOROTHIAZIDE PO   Dose:  12.5 mg        Take 12.5 mg by mouth daily as needed   Refills:  0        IBUPROFEN PO   Dose:  200 mg        Take 200 mg by mouth daily as needed for moderate pain   Refills:  0        LEVOTHYROXINE SODIUM PO   Dose:  100 mcg        Take 100 mcg by mouth daily   Refills:  0        LISINOPRIL PO   Dose:  40 mg        Take 40 mg by mouth daily   Refills:  0        oxyCODONE IR 5 MG tablet   Commonly known as:  ROXICODONE   Dose:  5 mg   Quantity:  60 tablet        Take 1 tablet (5 mg) by mouth every 6 hours as needed for moderate to severe pain   Refills:  0        prenatal multivitamin plus iron 27-0.8 MG Tabs per tablet   Dose:  1 tablet        Take 1 tablet by mouth daily   Refills:  0        PROAIR HFA IN   Dose:  1-2 puff        Inhale 1-2 puffs into the lungs every 4 hours as needed (chest tightness/wheezing)   Refills:  0        senna-docusate 8.6-50 MG per tablet   Commonly known as:  SENOKOT-S;PERICOLACE   Dose:  1-2 tablet   Quantity:  60 tablet        Take 1-2 tablets by mouth 2 times daily   Refills:  0        VITAMIN D (CHOLECALCIFEROL) PO   Dose:  1000 Units        Take 1,000 Units by mouth daily   Refills:  0                Procedures and tests performed during your visit      CBC with platelets differential    Comprehensive metabolic panel    EKG 12-lead, tracing only    Peripheral IV catheter    TSH with free T4 reflex    UA reflex to Microscopic and Culture      Orders Needing Specimen Collection     None      Pending Results     No orders found from 11/18/2018 to 11/21/2018.            Pending Culture Results     No orders found from 11/18/2018 to 11/21/2018.            Pending Results Instructions     If you had any lab results that were not finalized at the time of your Discharge, you can call the ED Lab Result RN at [phone]. You will be contacted by this team for any positive Lab results or changes in treatment. The nurses are available 7 days a week from 10A to 6:30P.  You can leave a message 24 hours per day and they will return your call.        Test Results From Your Hospital Stay        11/20/2018  1:20 PM      Component Results     Component Value Ref Range & Units Status    WBC 5.7 4.0 - 11.0 10e9/L Final    RBC Count 4.51 3.8 - 5.2 10e12/L Final    Hemoglobin 13.4 11.7 - 15.7 g/dL Final    Hematocrit 40.1 35.0 - 47.0 % Final    MCV 89 78 - 100 fl Final    MCH 29.7 26.5 - 33.0 pg Final    MCHC 33.4 31.5 - 36.5 g/dL Final    RDW 14.4 10.0 - 15.0 % Final    Platelet Count 235 150 - 450 10e9/L Final    Diff Method Automated Method  Final    % Neutrophils 69.9 % Final    % Lymphocytes 20.2 % Final    % Monocytes 7.6 % Final    % Eosinophils 1.9 % Final    % Basophils 0.4 % Final    % Immature Granulocytes 0.0 % Final    Nucleated RBCs 0 0 /100 Final    Absolute Neutrophil 4.0 1.6 - 8.3 10e9/L Final    Absolute Lymphocytes 1.2 0.8 - 5.3 10e9/L Final    Absolute Monocytes 0.4 0.0 - 1.3 10e9/L Final    Absolute Eosinophils 0.1 0.0 - 0.7 10e9/L Final    Absolute Basophils 0.0 0.0 - 0.2 10e9/L Final    Abs Immature Granulocytes 0.0 0 - 0.4 10e9/L Final    Absolute Nucleated RBC 0.0  Final         11/20/2018  1:46 PM      Component Results     Component Value Ref Range &  Units Status    Sodium 137 133 - 144 mmol/L Final    Potassium 4.2 3.4 - 5.3 mmol/L Final    Chloride 103 94 - 109 mmol/L Final    Carbon Dioxide 26 20 - 32 mmol/L Final    Anion Gap 8 3 - 14 mmol/L Final    Glucose 94 70 - 99 mg/dL Final    Urea Nitrogen 10 7 - 30 mg/dL Final    Creatinine 1.09 (H) 0.52 - 1.04 mg/dL Final    GFR Estimate 50 (L) >60 mL/min/1.7m2 Final    Non  GFR Calc    GFR Estimate If Black 60 (L) >60 mL/min/1.7m2 Final    African American GFR Calc    Calcium 8.6 8.5 - 10.1 mg/dL Final    Bilirubin Total 0.4 0.2 - 1.3 mg/dL Final    Albumin 3.5 3.4 - 5.0 g/dL Final    Protein Total 7.3 6.8 - 8.8 g/dL Final    Alkaline Phosphatase 114 40 - 150 U/L Final    ALT 18 0 - 50 U/L Final    AST 13 0 - 45 U/L Final         11/20/2018  1:51 PM      Component Results     Component Value Ref Range & Units Status    TSH 2.66 0.40 - 4.00 mU/L Final         11/20/2018  3:12 PM      Component Results     Component Value Ref Range & Units Status    Color Urine Straw  Final    Appearance Urine Clear  Final    Glucose Urine Negative NEG^Negative mg/dL Final    Bilirubin Urine Negative NEG^Negative Final    Ketones Urine Negative NEG^Negative mg/dL Final    Specific Gravity Urine 1.002 (L) 1.003 - 1.035 Final    Blood Urine Negative NEG^Negative Final    pH Urine 6.5 5.0 - 7.0 pH Final    Protein Albumin Urine Negative NEG^Negative mg/dL Final    Urobilinogen mg/dL Normal 0.0 - 2.0 mg/dL Final    Nitrite Urine Negative NEG^Negative Final    Leukocyte Esterase Urine Negative NEG^Negative Final    Source Midstream Urine  Final                Clinical Quality Measure: Blood Pressure Screening     Your blood pressure was checked while you were in the emergency department today. The last reading we obtained was  BP: 139/87 . Please read the guidelines below about what these numbers mean and what you should do about them.  If your systolic blood pressure (the top number) is less than 120 and your diastolic  "blood pressure (the bottom number) is less than 80, then your blood pressure is normal. There is nothing more that you need to do about it.  If your systolic blood pressure (the top number) is 120-139 or your diastolic blood pressure (the bottom number) is 80-89, your blood pressure may be higher than it should be. You should have your blood pressure rechecked within a year by a primary care provider.  If your systolic blood pressure (the top number) is 140 or greater or your diastolic blood pressure (the bottom number) is 90 or greater, you may have high blood pressure. High blood pressure is treatable, but if left untreated over time it can put you at risk for heart attack, stroke, or kidney failure. You should have your blood pressure rechecked by a primary care provider within the next 4 weeks.  If your provider in the emergency department today gave you specific instructions to follow-up with your doctor or provider even sooner than that, you should follow that instruction and not wait for up to 4 weeks for your follow-up visit.        Thank you for choosing [facility]       Thank you for choosing [facility] for your care. Our goal is always to provide you with excellent care. Hearing back from our patients is one way we can continue to improve our services. Please take a few minutes to complete the written survey that you may receive in the mail after you visit with us. Thank you!        [employer] Information     [employer] lets you send messages to your doctor, view your test results, renew your prescriptions, schedule appointments and more. To sign up, go to [email or web address] . Click on \"Log in\" on the left side of the screen, which will take you to the Welcome page. Then click on \"Sign up Now\" on the right side of the page.     You will be asked to enter the access code listed below, as well as some personal information. Please follow the directions to create your username and password.     Your access code " is: [email or web address]  Expires: 2019  1:57 PM     Your access code will  in 90 days. If you need help or a new code, please call your [facility] clinic or [phone].        Care EveryWhere ID     This is your Care EveryWhere ID. This could be used by other organizations to access your [facility] medical records  [id number]        Equal Access to Services     MAKI BEST : Hadii julian harris hadasho Socristobalali, waaxda luqadaha, qaybta kaalmada adeegyada, waxay ligiain haymallory niño . So [facility] [phone].    ATENCIÓN: Si habla jannyañol, tiene a lopez disposición servicios gratuitos de asistencia lingüística. Llame al [phone].    We comply with applicable federal civil rights laws and [place] laws. We do not discriminate on the basis of race, color, national origin, age, disability, sex, sexual orientation, or gender identity.            After Visit Summary       This is your record. Keep this with you and show to your community pharmacist(s) and doctor(s) at your next visit.

## 2019-04-29 ENCOUNTER — HOSPITAL ENCOUNTER (OUTPATIENT)
Dept: MAMMOGRAPHY | Facility: CLINIC | Age: 69
Discharge: HOME | End: 2019-04-29
Attending: PHYSICIAN ASSISTANT | Admitting: PHYSICIAN ASSISTANT
Payer: COMMERCIAL

## 2019-04-29 DIAGNOSIS — Z12.31 VISIT FOR SCREENING MAMMOGRAM: ICD-10-CM

## 2019-04-29 PROCEDURE — 77063 BREAST TOMOSYNTHESIS BI: CPT

## 2019-07-02 ENCOUNTER — HOSPITAL ENCOUNTER (OUTPATIENT)
Facility: CLINIC | Age: 69
Discharge: HOME | End: 2019-07-02
Attending: COLON & RECTAL SURGERY | Admitting: COLON & RECTAL SURGERY
Payer: COMMERCIAL

## 2019-07-02 VITALS
OXYGEN SATURATION: 96 % | RESPIRATION RATE: 10 BRPM | WEIGHT: 182 LBS | HEIGHT: 65 IN | HEART RATE: 78 BPM | DIASTOLIC BLOOD PRESSURE: 85 MMHG | SYSTOLIC BLOOD PRESSURE: 137 MMHG | BODY MASS INDEX: 30.32 KG/M2

## 2019-07-02 LAB — COLONOSCOPY: NORMAL

## 2019-07-02 PROCEDURE — 88305 TISSUE EXAM BY PATHOLOGIST: CPT | Performed by: COLON & RECTAL SURGERY

## 2019-07-02 PROCEDURE — 25000128 H RX IP 250 OP 636: Performed by: COLON & RECTAL SURGERY

## 2019-07-02 PROCEDURE — 25800030 ZZH RX IP 258 OP 636: Performed by: COLON & RECTAL SURGERY

## 2019-07-02 PROCEDURE — 99153 MOD SED SAME PHYS/QHP EA: CPT

## 2019-07-02 PROCEDURE — 45385 COLONOSCOPY W/LESION REMOVAL: CPT | Performed by: COLON & RECTAL SURGERY

## 2019-07-02 PROCEDURE — G0500 MOD SEDAT ENDO SERVICE >5YRS: HCPCS | Performed by: COLON & RECTAL SURGERY

## 2019-07-02 PROCEDURE — 88305 TISSUE EXAM BY PATHOLOGIST: CPT | Mod: 26 | Performed by: COLON & RECTAL SURGERY

## 2019-07-02 RX ORDER — PROCHLORPERAZINE MALEATE 5 MG
5 TABLET ORAL EVERY 6 HOURS PRN
Status: DISCONTINUED | OUTPATIENT
Start: 2019-07-02 | End: 2019-07-02 | Stop reason: HOSPADM

## 2019-07-02 RX ORDER — ONDANSETRON 2 MG/ML
INJECTION INTRAMUSCULAR; INTRAVENOUS PRN
Status: DISCONTINUED | OUTPATIENT
Start: 2019-07-02 | End: 2019-07-02 | Stop reason: HOSPADM

## 2019-07-02 RX ORDER — FLUMAZENIL 0.1 MG/ML
0.2 INJECTION, SOLUTION INTRAVENOUS
Status: DISCONTINUED | OUTPATIENT
Start: 2019-07-02 | End: 2019-07-02 | Stop reason: HOSPADM

## 2019-07-02 RX ORDER — LIDOCAINE 40 MG/G
CREAM TOPICAL
Status: DISCONTINUED | OUTPATIENT
Start: 2019-07-02 | End: 2019-07-02 | Stop reason: HOSPADM

## 2019-07-02 RX ORDER — ONDANSETRON 2 MG/ML
4 INJECTION INTRAMUSCULAR; INTRAVENOUS EVERY 6 HOURS PRN
Status: DISCONTINUED | OUTPATIENT
Start: 2019-07-02 | End: 2019-07-02 | Stop reason: HOSPADM

## 2019-07-02 RX ORDER — ONDANSETRON 2 MG/ML
4 INJECTION INTRAMUSCULAR; INTRAVENOUS
Status: DISCONTINUED | OUTPATIENT
Start: 2019-07-02 | End: 2019-07-02 | Stop reason: HOSPADM

## 2019-07-02 RX ORDER — ONDANSETRON 4 MG/1
4 TABLET, ORALLY DISINTEGRATING ORAL EVERY 6 HOURS PRN
Status: DISCONTINUED | OUTPATIENT
Start: 2019-07-02 | End: 2019-07-02 | Stop reason: HOSPADM

## 2019-07-02 RX ORDER — NALOXONE HYDROCHLORIDE 0.4 MG/ML
.1-.4 INJECTION, SOLUTION INTRAMUSCULAR; INTRAVENOUS; SUBCUTANEOUS
Status: DISCONTINUED | OUTPATIENT
Start: 2019-07-02 | End: 2019-07-02 | Stop reason: HOSPADM

## 2019-07-02 RX ORDER — SODIUM CHLORIDE 9 MG/ML
INJECTION, SOLUTION INTRAVENOUS CONTINUOUS PRN
Status: DISCONTINUED | OUTPATIENT
Start: 2019-07-02 | End: 2019-07-02 | Stop reason: HOSPADM

## 2019-07-02 RX ORDER — FENTANYL CITRATE 50 UG/ML
INJECTION, SOLUTION INTRAMUSCULAR; INTRAVENOUS PRN
Status: DISCONTINUED | OUTPATIENT
Start: 2019-07-02 | End: 2019-07-02 | Stop reason: HOSPADM

## 2019-07-02 ASSESSMENT — MIFFLIN-ST. JEOR: SCORE: 1338.49

## 2019-07-02 NOTE — H&P
Pre-Endoscopy History and Physical   Jayshree Hastings MRN# [id number]   YOB: 1950 Age: 68 year old   Date of Procedure: 2019   Primary care provider: Kisha Diego   Type of Endoscopy: colonoscopy   Reason for Procedure: screening   Type of Anesthesia Anticipated: Moderate Sedation   HPI:   Jayshree is a 68 year old female for screening colonoscopy.  She reports having last had a colonoscopy in  where polyps were removed.  She was told to return in 5 years but she never did.  She denies BRBPR, abdominal pain, nausea/vomiting, changes in bowel habits or unintentional weight loss.  She denies a FH of CRC.    Allergies   Allergen Reactions     Amoxicillin Nausea and Vomiting      Prior to Admission Medications   Prescriptions Last Dose Informant Patient Reported? Taking?   Albuterol Sulfate (PROAIR HFA IN) Past Month at Unknown time Self Yes Yes   Sig: Inhale 1-2 puffs into the lungs every 4 hours as needed (chest tightness/wheezing)   HYDROCHLOROTHIAZIDE PO Unknown at Unknown time Self Yes No   Sig: Take 12.5 mg by mouth daily as needed    IBUPROFEN PO Past Week at Unknown time Self Yes Yes   Sig: Take 200 mg by mouth daily as needed for moderate pain   LEVOTHYROXINE SODIUM PO 2019 at Unknown time Self Yes Yes   Sig: Take 100 mcg by mouth daily   LISINOPRIL PO 2019 at Unknown time Self Yes Yes   Sig: Take 40 mg by mouth daily   VITAMIN D, CHOLECALCIFEROL, PO 2019 at Unknown time Self Yes Yes   Sig: Take 1,000 Units by mouth daily   acetaminophen (TYLENOL) 325 MG tablet 2019 at Unknown time  No Yes   Sig: Take 3 tablets (975 mg) by mouth every 8 hours   aspirin 325 MG tablet Unknown at Unknown time  No No   Si mg  Bid with food  For 1  Week then  325 mg/day  For  2 more  Weeks  With food      Facility-Administered Medications: None      Patient Active Problem List   Diagnosis     H/O total hip arthroplasty, right      Past Medical History:   Diagnosis Date     Allergic  "rhinitis      Asthma, mild intermittent      Hypertension      Hypothyroid       Past Surgical History:   Procedure Laterality Date     ARTHROPLASTY HIP Right 9/20/2017    Procedure: ARTHROPLASTY HIP;  RIGHT TOTAL HIP ARTHROPLASTY ;  Surgeon: Chacho Cates MD;  Location:  OR     [facility]        Social History     Tobacco Use     Smoking status: Current Every Day Smoker     Packs/day: 0.10     Years: 50.00     Pack years: 5.00     Types: Cigarettes     Smokeless tobacco: Never Used     Tobacco comment: 5 cigs/day   Substance Use Topics     Alcohol use: Yes     Comment: 1-2 times per week      History reviewed. No pertinent family history.   PHYSICAL EXAM:   BP (!) 153/105   Pulse 85   Resp 18   Ht 1.638 m (5' 4.5\")   Wt 82.6 kg (182 lb)   SpO2 97%   BMI 30.76 kg/m   Estimated body mass index is 30.76 kg/m  as calculated from the following:    Height as of this encounter: 1.638 m (5' 4.5\").    Weight as of this encounter: 82.6 kg (182 lb).   RESP: lungs clear to auscultation - no rales, rhonchi or wheezes   CV: regular rates and rhythm   ASA Class 2 - Mild systemic disease    Assessment: 67 y/o woman for screening colonoscopy    Plan: Colonoscopy with moderate sedation.  Risks of the procedure were discussed including, but not limited to, bleeding, perforation and missed lesions.  Patient understands and is willing to proceed.    Anthony Yeung MD ....................  7/2/2019   10:08 AM  Colon and Rectal Surgery Staff  [phone]    "

## 2019-07-03 LAB — COPATH REPORT: NORMAL

## 2019-09-29 ENCOUNTER — HEALTH MAINTENANCE LETTER (OUTPATIENT)
Age: 69
End: 2019-09-29

## 2020-02-02 ENCOUNTER — APPOINTMENT (OUTPATIENT)
Dept: GENERAL RADIOLOGY | Facility: CLINIC | Age: 70
End: 2020-02-02
Attending: EMERGENCY MEDICINE
Payer: COMMERCIAL

## 2020-02-02 ENCOUNTER — HOSPITAL ENCOUNTER (EMERGENCY)
Facility: CLINIC | Age: 70
Discharge: HOME | End: 2020-02-02
Attending: EMERGENCY MEDICINE | Admitting: INTERNAL MEDICINE
Payer: COMMERCIAL

## 2020-02-02 VITALS
TEMPERATURE: 98.5 F | OXYGEN SATURATION: 96 % | RESPIRATION RATE: 16 BRPM | DIASTOLIC BLOOD PRESSURE: 94 MMHG | SYSTOLIC BLOOD PRESSURE: 175 MMHG | HEART RATE: 92 BPM

## 2020-02-02 DIAGNOSIS — J10.1 INFLUENZA A: ICD-10-CM

## 2020-02-02 DIAGNOSIS — J44.1 COPD EXACERBATION (H): ICD-10-CM

## 2020-02-02 DIAGNOSIS — J96.01 ACUTE RESPIRATORY FAILURE WITH HYPOXIA (H): ICD-10-CM

## 2020-02-02 LAB
ANION GAP SERPL CALCULATED.3IONS-SCNC: 4 MMOL/L (ref 3–14)
BASOPHILS # BLD AUTO: 0 10E9/L (ref 0–0.2)
BASOPHILS NFR BLD AUTO: 0.2 %
BUN SERPL-MCNC: 9 MG/DL (ref 7–30)
CALCIUM SERPL-MCNC: 8.6 MG/DL (ref 8.5–10.1)
CHLORIDE SERPL-SCNC: 105 MMOL/L (ref 94–109)
CO2 SERPL-SCNC: 27 MMOL/L (ref 20–32)
CREAT SERPL-MCNC: 0.85 MG/DL (ref 0.52–1.04)
DIFFERENTIAL METHOD BLD: ABNORMAL
EOSINOPHIL # BLD AUTO: 0 10E9/L (ref 0–0.7)
EOSINOPHIL NFR BLD AUTO: 0.3 %
ERYTHROCYTE [DISTWIDTH] IN BLOOD BY AUTOMATED COUNT: 14.1 % (ref 10–15)
FLUAV+FLUBV AG SPEC QL: NEGATIVE
FLUAV+FLUBV AG SPEC QL: POSITIVE
GFR SERPL CREATININE-BSD FRML MDRD: 69 ML/MIN/{1.73_M2}
GLUCOSE SERPL-MCNC: 139 MG/DL (ref 70–99)
HCT VFR BLD AUTO: 42.1 % (ref 35–47)
HGB BLD-MCNC: 13.5 G/DL (ref 11.7–15.7)
IMM GRANULOCYTES # BLD: 0 10E9/L (ref 0–0.4)
IMM GRANULOCYTES NFR BLD: 0.2 %
LYMPHOCYTES # BLD AUTO: 0.4 10E9/L (ref 0.8–5.3)
LYMPHOCYTES NFR BLD AUTO: 6.6 %
MCH RBC QN AUTO: 29.1 PG (ref 26.5–33)
MCHC RBC AUTO-ENTMCNC: 32.1 G/DL (ref 31.5–36.5)
MCV RBC AUTO: 91 FL (ref 78–100)
MONOCYTES # BLD AUTO: 0.4 10E9/L (ref 0–1.3)
MONOCYTES NFR BLD AUTO: 6.4 %
NEUTROPHILS # BLD AUTO: 5.2 10E9/L (ref 1.6–8.3)
NEUTROPHILS NFR BLD AUTO: 86.3 %
PLATELET # BLD AUTO: 219 10E9/L (ref 150–450)
POTASSIUM SERPL-SCNC: 3.6 MMOL/L (ref 3.4–5.3)
RBC # BLD AUTO: 4.64 10E12/L (ref 3.8–5.2)
SODIUM SERPL-SCNC: 136 MMOL/L (ref 133–144)
SPECIMEN SOURCE: ABNORMAL
WBC # BLD AUTO: 6.1 10E9/L (ref 4–11)

## 2020-02-02 PROCEDURE — 85025 COMPLETE CBC W/AUTO DIFF WBC: CPT | Performed by: EMERGENCY MEDICINE

## 2020-02-02 PROCEDURE — 96375 TX/PRO/DX INJ NEW DRUG ADDON: CPT

## 2020-02-02 PROCEDURE — 94640 AIRWAY INHALATION TREATMENT: CPT

## 2020-02-02 PROCEDURE — 80048 BASIC METABOLIC PNL TOTAL CA: CPT | Performed by: EMERGENCY MEDICINE

## 2020-02-02 PROCEDURE — 25000132 ZZH RX MED GY IP 250 OP 250 PS 637: Performed by: EMERGENCY MEDICINE

## 2020-02-02 PROCEDURE — 87804 INFLUENZA ASSAY W/OPTIC: CPT | Performed by: EMERGENCY MEDICINE

## 2020-02-02 PROCEDURE — 96374 THER/PROPH/DIAG INJ IV PUSH: CPT

## 2020-02-02 PROCEDURE — 99284 EMERGENCY DEPT VISIT MOD MDM: CPT | Mod: 25

## 2020-02-02 PROCEDURE — 96361 HYDRATE IV INFUSION ADD-ON: CPT

## 2020-02-02 PROCEDURE — 25800030 ZZH RX IP 258 OP 636: Performed by: EMERGENCY MEDICINE

## 2020-02-02 PROCEDURE — 71046 X-RAY EXAM CHEST 2 VIEWS: CPT

## 2020-02-02 PROCEDURE — 25000128 H RX IP 250 OP 636: Performed by: EMERGENCY MEDICINE

## 2020-02-02 PROCEDURE — 25000125 ZZHC RX 250: Performed by: EMERGENCY MEDICINE

## 2020-02-02 PROCEDURE — 99223 1ST HOSP IP/OBS HIGH 75: CPT | Mod: AI | Performed by: INTERNAL MEDICINE

## 2020-02-02 RX ORDER — OSELTAMIVIR PHOSPHATE 75 MG/1
75 CAPSULE ORAL 2 TIMES DAILY
Status: CANCELLED | OUTPATIENT
Start: 2020-02-02 | End: 2020-02-06

## 2020-02-02 RX ORDER — METHYLPREDNISOLONE SODIUM SUCCINATE 125 MG/2ML
60 INJECTION, POWDER, LYOPHILIZED, FOR SOLUTION INTRAMUSCULAR; INTRAVENOUS EVERY 8 HOURS
Status: CANCELLED | OUTPATIENT
Start: 2020-02-02

## 2020-02-02 RX ORDER — IPRATROPIUM BROMIDE AND ALBUTEROL SULFATE 2.5; .5 MG/3ML; MG/3ML
3 SOLUTION RESPIRATORY (INHALATION) ONCE
Status: COMPLETED | OUTPATIENT
Start: 2020-02-02 | End: 2020-02-02

## 2020-02-02 RX ORDER — PROCHLORPERAZINE MALEATE 5 MG
5 TABLET ORAL EVERY 6 HOURS PRN
Status: CANCELLED | OUTPATIENT
Start: 2020-02-02

## 2020-02-02 RX ORDER — ONDANSETRON 4 MG/1
4 TABLET, ORALLY DISINTEGRATING ORAL EVERY 6 HOURS PRN
Status: CANCELLED | OUTPATIENT
Start: 2020-02-02

## 2020-02-02 RX ORDER — LISINOPRIL 40 MG/1
40 TABLET ORAL DAILY
COMMUNITY

## 2020-02-02 RX ORDER — PROCHLORPERAZINE 25 MG
12.5 SUPPOSITORY, RECTAL RECTAL EVERY 12 HOURS PRN
Status: CANCELLED | OUTPATIENT
Start: 2020-02-02

## 2020-02-02 RX ORDER — OSELTAMIVIR PHOSPHATE 75 MG/1
75 CAPSULE ORAL 2 TIMES DAILY
Qty: 10 CAPSULE | Refills: 0 | Status: SHIPPED | OUTPATIENT
Start: 2020-02-02 | End: 2020-02-07

## 2020-02-02 RX ORDER — METHYLPREDNISOLONE SODIUM SUCCINATE 125 MG/2ML
40 INJECTION, POWDER, LYOPHILIZED, FOR SOLUTION INTRAMUSCULAR; INTRAVENOUS EVERY 8 HOURS
Status: CANCELLED | OUTPATIENT
Start: 2020-02-02

## 2020-02-02 RX ORDER — ACETAMINOPHEN 325 MG/1
650 TABLET ORAL EVERY 4 HOURS PRN
Status: CANCELLED | OUTPATIENT
Start: 2020-02-02

## 2020-02-02 RX ORDER — ONDANSETRON 2 MG/ML
4 INJECTION INTRAMUSCULAR; INTRAVENOUS EVERY 6 HOURS PRN
Status: CANCELLED | OUTPATIENT
Start: 2020-02-02

## 2020-02-02 RX ORDER — IBUPROFEN 600 MG/1
600 TABLET, FILM COATED ORAL ONCE
Status: COMPLETED | OUTPATIENT
Start: 2020-02-02 | End: 2020-02-02

## 2020-02-02 RX ORDER — ALBUTEROL SULFATE 0.83 MG/ML
5 SOLUTION RESPIRATORY (INHALATION) EVERY 4 HOURS PRN
Qty: 1 BOX | Refills: 0 | Status: SHIPPED | OUTPATIENT
Start: 2020-02-02

## 2020-02-02 RX ORDER — ONDANSETRON 2 MG/ML
4 INJECTION INTRAMUSCULAR; INTRAVENOUS ONCE
Status: COMPLETED | OUTPATIENT
Start: 2020-02-02 | End: 2020-02-02

## 2020-02-02 RX ORDER — SODIUM CHLORIDE 9 MG/ML
INJECTION, SOLUTION INTRAVENOUS CONTINUOUS
Status: CANCELLED | OUTPATIENT
Start: 2020-02-02

## 2020-02-02 RX ORDER — ASPIRIN 81 MG/1
81 TABLET ORAL DAILY
COMMUNITY

## 2020-02-02 RX ORDER — IPRATROPIUM BROMIDE AND ALBUTEROL SULFATE 2.5; .5 MG/3ML; MG/3ML
3 SOLUTION RESPIRATORY (INHALATION) EVERY 4 HOURS PRN
Status: CANCELLED | OUTPATIENT
Start: 2020-02-02

## 2020-02-02 RX ORDER — IPRATROPIUM BROMIDE AND ALBUTEROL SULFATE 2.5; .5 MG/3ML; MG/3ML
3 SOLUTION RESPIRATORY (INHALATION) 4 TIMES DAILY
Status: CANCELLED | OUTPATIENT
Start: 2020-02-02

## 2020-02-02 RX ORDER — LEVOTHYROXINE SODIUM 100 UG/1
100 TABLET ORAL
COMMUNITY

## 2020-02-02 RX ORDER — OSELTAMIVIR PHOSPHATE 75 MG/1
75 CAPSULE ORAL ONCE
Status: COMPLETED | OUTPATIENT
Start: 2020-02-02 | End: 2020-02-02

## 2020-02-02 RX ORDER — PREDNISONE 20 MG/1
TABLET ORAL
Qty: 10 TABLET | Refills: 0 | Status: SHIPPED | OUTPATIENT
Start: 2020-02-02

## 2020-02-02 RX ORDER — POLYETHYLENE GLYCOL 3350 17 G/17G
17 POWDER, FOR SOLUTION ORAL DAILY PRN
Status: CANCELLED | OUTPATIENT
Start: 2020-02-02

## 2020-02-02 RX ORDER — SODIUM CHLORIDE 9 MG/ML
INJECTION, SOLUTION INTRAVENOUS CONTINUOUS
Status: CANCELLED | OUTPATIENT
Start: 2020-02-02 | End: 2020-02-03

## 2020-02-02 RX ORDER — NALOXONE HYDROCHLORIDE 0.4 MG/ML
.1-.4 INJECTION, SOLUTION INTRAMUSCULAR; INTRAVENOUS; SUBCUTANEOUS
Status: CANCELLED | OUTPATIENT
Start: 2020-02-02

## 2020-02-02 RX ORDER — METHYLPREDNISOLONE SODIUM SUCCINATE 125 MG/2ML
125 INJECTION, POWDER, LYOPHILIZED, FOR SOLUTION INTRAMUSCULAR; INTRAVENOUS ONCE
Status: COMPLETED | OUTPATIENT
Start: 2020-02-02 | End: 2020-02-02

## 2020-02-02 RX ORDER — ACETAMINOPHEN 325 MG/1
650 TABLET ORAL ONCE
Status: COMPLETED | OUTPATIENT
Start: 2020-02-02 | End: 2020-02-02

## 2020-02-02 RX ADMIN — METHYLPREDNISOLONE SODIUM SUCCINATE 125 MG: 125 INJECTION, POWDER, FOR SOLUTION INTRAMUSCULAR; INTRAVENOUS at 09:36

## 2020-02-02 RX ADMIN — IBUPROFEN 600 MG: 600 TABLET ORAL at 09:36

## 2020-02-02 RX ADMIN — IPRATROPIUM BROMIDE AND ALBUTEROL SULFATE 3 ML: .5; 3 SOLUTION RESPIRATORY (INHALATION) at 09:36

## 2020-02-02 RX ADMIN — SODIUM CHLORIDE 1000 ML: 9 INJECTION, SOLUTION INTRAVENOUS at 09:36

## 2020-02-02 RX ADMIN — OSELTAMIVIR PHOSPHATE 75 MG: 75 CAPSULE ORAL at 11:08

## 2020-02-02 RX ADMIN — ONDANSETRON 4 MG: 2 INJECTION INTRAMUSCULAR; INTRAVENOUS at 09:36

## 2020-02-02 RX ADMIN — ACETAMINOPHEN 650 MG: 325 TABLET, FILM COATED ORAL at 09:36

## 2020-02-02 ASSESSMENT — ENCOUNTER SYMPTOMS
HEADACHES: 1
ABDOMINAL PAIN: 0
APPETITE CHANGE: 1
FEVER: 1
COUGH: 1
CHEST TIGHTNESS: 1
VOMITING: 0
MYALGIAS: 1
DIARRHEA: 0
SHORTNESS OF BREATH: 0
NAUSEA: 1
WEAKNESS: 1
SORE THROAT: 0
FATIGUE: 1

## 2020-02-02 NOTE — ED NOTES
[facility]  ED Nurse Handoff Report    ED Chief complaint: Flu Symptoms      ED Diagnosis:   Final diagnoses:   None       Code Status: Full Code    Allergies:   Allergies   Allergen Reactions     Amoxicillin Nausea and Vomiting       Patient Story: Jayshree comes to the ER today for h/a, cough, and body aches that started yesterday. Her influenza was positive. She was going to go home but her sats did drop to 88% on room air. CXR negative. She was given solumedrol, a duo neb, one liter of ns and zofran. She also received tylenol and motrin.      Treatments and/or interventions provided: pain control   Patient's response to treatments and/or interventions: good    To be done/followed up on inpatient unit:  monitor sats    Does this patient have any cognitive concerns?: none    Activity level - Baseline/Home:  Independent  Activity Level - Current:   Independent    Patient's Preferred language: English   Needed?: No    Isolation: droplet and contact  Infection: mrsa  Influenza  Bariatric?: No    Vital Signs:   Vitals:    02/02/20 1000 02/02/20 1010 02/02/20 1050 02/02/20 1059   BP:       Pulse:       Resp:       Temp:       SpO2: 95% 92% (!) 88% 94%       Cardiac Rhythm:     Was the PSS-3 completed:   Yes  What interventions are required if any?               Family Comments:  at bedside  OBS brochure/video discussed/provided to patient/family: Yes              Name of person given brochure if not patient: /na              Relationship to patient: n/a    For the majority of the shift this patient's behavior was Green.   Behavioral interventions performed were na.    ED NURSE PHONE NUMBER: [phone]

## 2020-02-02 NOTE — PROGRESS NOTES
RECEIVING UNIT ED HANDOFF REVIEW    ED Nurse Handoff Report was reviewed by: Zahida Zapata RN on February 2, 2020 at 12:37 PM

## 2020-02-02 NOTE — ED PROVIDER NOTES
History     Chief Complaint:  Flu Symptoms    HPI   Jayshree Hastings is a 69 year old female with a history of hypertension, hypothyroidism, among others who presents with  for evaluation of a headache, associated with subjective fever, nausea, decreased appetite, chest tightness, cough, congestion, fatigue, generalized weakness, and generalized myalgias, that began yesterday. The patient states about 2 nights ago she began having a cough and yesterday morning she began feeling below baseline with a headache, decreased appetite, congestion, fatigue, nausea, and subjective fever. Her symptoms progressively worsened since initial onset, prompting her presentation. She denies any sore throat, abdominal pain, diarrhea, emesis, or chest pain.     Allergies:  Amoxicillin     Medications:    Aspirin  Levothyroxine  Lisinopril  Vitamin D   Xanax  Hydrochlorothiazide     Past Medical History:    Asthma  Hypertension  Anxiety  Hypothyroidism    Past Surgical History:    Right hip arthroplasty  [place] teeth    Family History:    Daughter - Diabetes, heart attack    Social History:  The patient was accompanied to the ED by .  Smoking Status: Current, 0.10 packs/day  Smokeless Tobacco: Never  Alcohol Use: Yes  Drug Use: No   Marital Status:       Review of Systems   Constitutional: Positive for appetite change, fatigue and fever (Subjective).   HENT: Positive for congestion. Negative for sore throat.    Respiratory: Positive for cough and chest tightness. Negative for shortness of breath.    Cardiovascular: Negative for chest pain.   Gastrointestinal: Positive for nausea. Negative for abdominal pain, diarrhea and vomiting.   Musculoskeletal: Positive for myalgias.   Neurological: Positive for weakness and headaches.   All other systems reviewed and are negative.    Physical Exam     Patient Vitals for the past 24 hrs:   BP Temp Pulse Resp SpO2   02/02/20 1130 -- -- -- -- 96 %   02/02/20 1100 -- -- -- --  95 %   02/02/20 1059 -- -- -- -- 94 %   02/02/20 1050 -- -- -- -- (!) 88 %   02/02/20 1010 -- -- -- -- 92 %   02/02/20 1000 -- -- -- -- 95 %   02/02/20 0940 -- -- -- -- 92 %   02/02/20 0900 (!) 175/94 98.5  F (36.9  C) 92 16 94 %     Physical Exam  Nursing note and vitals reviewed.  Constitutional:  Appears well-developed and well-nourished.   HENT:    TMs clear.  Head:    Atraumatic.   Mouth/Throat:   Oropharynx is clear and moist. No oropharyngeal exudate.   Eyes:    Pupils are equal, round, and reactive to light.   Neck:    Normal range of motion. Neck supple.      No tracheal deviation present. No thyromegaly present.   Cardiovascular:  Normal rate, regular rhythm, no murmur   Pulmonary/Chest: Few expiratory wheezes..  Abdominal:   Soft. Bowel sounds are normal. Exhibits no distension and      no mass. There is no tenderness.      There is no rebound and no guarding.   Musculoskeletal:  Exhibits no edema.   Lymphadenopathy:  No cervical adenopathy.   Neurological:   Alert and oriented to person, place, and time.   Skin:    Skin is warm and dry. No rash noted. No pallor.      Emergency Department Course   Imaging:  Radiology findings were communicated with the patient who voiced understanding of the findings.    XR Chest 2 Views  IMPRESSION: Since July 8, 2007, heart size is normal. No pleural  effusion, pneumothorax, or abnormal area of consolidation. Minimal  bibasilar opacity most suggestive of atelectasis.  Reading per radiology.     Laboratory:  Laboratory findings were communicated with the patient who voiced understanding of the findings.    CBC: AWNL (WBC 6.1, HGB 13.5, )   BMP: Glucose 139 (H) o/w WNL (Creatinine 0.85)     Influenza A/B Antigen: Influenza (A) o/w negative     Interventions:  0936 0.9% NaCl bolus 1000 mL IV   0936 Zofran 4 mg IV  0936 Duoneb 3 mL Nebulization  0936 Solu-medrol 125 mg IV  0936 Tylenol 650 mg PO  0936 Advil 600 mg PO   1108 Tamiflu 75 mg PO    Emergency Department  Course:  Nursing notes and vitals reviewed.  The patient was sent for a XR Chest 2 Views while in the emergency department, results above.    IV was inserted and blood was drawn for laboratory testing, results above.   A nasal swab was obtained for laboratory testing, findings above.      (5340)   I performed an exam of the patient as documented above. History obtained from patient.    (1013)   I rechecked the patient and discussed results and plan of care.     (1102)   Hospitalist paged.    (1109)   I rechecked the patient and discussed results and plan of care.     (1112)   I spoke with Dr. Beard of the Hospitalist service regarding patient's presentation, findings, and plan of care.      (8888)   The patient would like to go home and now refuses admission.    Findings and plan explained to the Patient. Patient discharged home with instructions regarding supportive care, medications, and reasons to return. The importance of close follow-up was reviewed. The patient was prescribed Tamiflu, Deltasone, and Proventil. I personally reviewed the laboratory results with the patient and answered all questions prior to discharge.     Impression & Plan      Medical Decision Making:  I found this patient her to have acute influenza type A with associated hypoxic respiratory failure due to COPD exacerbation. There was no sign of bacterial pneumonia, pulmonary embolism, or pneumothorax. She was not septic or toxic appearing. She was the above treatments and Tamiflu for the influenza. She was placed on supplemental oxygen, and the original plan was that she be admitted to the hospital inpatient medically to the care of the hospitalist.  However, the patient ultimately refused hospital admission and insisted to go home.  I spoke with her and told her that I feel that she should be admitted due to her hypoxia and respiratory illness, however she continues to refuse admission so she was discharged on home nebulizer treatments,  Tamiflu and Prednisone and told to return at any time if her condition changes.    Diagnosis:    ICD-10-CM    1. Acute respiratory failure with hypoxia (H) J96.01    2. Influenza A J10.1    3. COPD exacerbation (H) J44.1       Disposition:   Discharge to home.    Discharge Medications:     Medication List      Started    albuterol (2.5 MG/3ML) 0.083% neb solution  Commonly known as:  PROVENTIL  5 mg, Nebulization, EVERY 4 HOURS PRN  Replaces:  PROAIR HFA IN     oseltamivir 75 MG capsule  Commonly known as:  Tamiflu  75 mg, Oral, 2 TIMES DAILY     predniSONE 20 MG tablet  Commonly known as:  DELTASONE  Take two tablets (= 40mg) each day for 5 (five) days        Discontinued    PROAIR HFA IN  Replaced by:  albuterol (2.5 MG/3ML) 0.083% neb solution        ASK your doctor about these medications    aspirin 81 MG EC tablet  Ask about: Which instructions should I use?     levothyroxine 100 MCG tablet  Commonly known as:  SYNTHROID/LEVOTHROID  Ask about: Which instructions should I use?     lisinopril 40 MG tablet  Commonly known as:  PRINIVIL/ZESTRIL  Ask about: Which instructions should I use?              Scribe Disclosure:  I, Alli Gilman, am serving as a scribe at 9:11 AM on 2/2/2020 to document services personally performed by Fe Nelson MD, based on my observations and the provider's statements to me.  2/2/2020    EMERGENCY DEPARTMENT       Fe Nelson MD  02/02/20 1514

## 2020-02-02 NOTE — ED AVS SNAPSHOT
Emergency Department  [address] [facility] [place] [address]  Phone:  [phone]  Fax:  [phone]                                    Jayshree Hastings   MRN: [id number]    Department:   Emergency Department   Date of Visit:  2/2/2020           After Visit Summary Signature Page    I have received my discharge instructions, and my questions have been answered. I have discussed any challenges I see with this plan with the nurse or doctor.    ..........................................................................................................................................  Patient/Patient Representative Signature      ..........................................................................................................................................  Patient Representative Print Name and Relationship to Patient    ..................................................               ................................................  Date                                   Time    ..........................................................................................................................................  Reviewed by Signature/Title    ...................................................              ..............................................  Date                                               Time          22EPIC Rev 08/18

## 2020-02-02 NOTE — PHARMACY-ADMISSION MEDICATION HISTORY
Pharmacy Medication History  Admission medication history interview status for the 2/2/2020  admission is complete. See EPIC admission navigator for prior to admission medications     Medication history sources: Patient  Medication history source reliability: Good  Adherence assessment: Good    Significant changes made to the medication list:  none    Additional medication history information:   none    Medication reconciliation completed by provider prior to medication history? No    Time spent in this activity: 30 minutes      Prior to Admission medications    Medication Sig Last Dose Taking? Auth Provider   Albuterol Sulfate (PROAIR HFA IN) Inhale 1-2 puffs into the lungs every 4 hours as needed (chest tightness/wheezing) as needed Yes Reported, Patient   aspirin 81 MG EC tablet Take 81 mg by mouth daily 2/2/2020 at am Yes Unknown, Entered By History   levothyroxine (SYNTHROID/LEVOTHROID) 100 MCG tablet Take 100 mcg by mouth daily before breakfast 2/2/2020 at am Yes Unknown, Entered By History   lisinopril (PRINIVIL/ZESTRIL) 40 MG tablet Take 40 mg by mouth daily 2/2/2020 at am Yes Unknown, Entered By History

## 2020-02-02 NOTE — H&P
Admitted:     02/02/2020      PRIMARY CARE PROVIDER:  ALFONZO Dao      CHIEF COMPLAINT:  Flu-like symptoms.      HISTORY OF PRESENT ILLNESS:  Ms. Jayshree Hastings is a 69-year-old female patient with a history including COPD with continued tobacco use, hypertension and hypothyroidism, who presents with the above complaints.  The patient says that about 2 nights ago, she started having a cough.  Yesterday morning noted a headache, decreased appetite, congestion, fatigue, as well as some nausea.  Symptoms progressively worsened yesterday.  She did not improve today, and she came into [facility] for further evaluation.      The patient was seen in the ER, had vitals that showed a temper down to 88% on room air here.  She had laboratory evaluation that did showed a normal white count.  She was positive for influenza A.  Chest x-ray did not show any focal infiltrates.  She had treatments in the ER including a fluid bolus, ibuprofen, nebulizers, IV methylprednisolone and a dose of oseltamivir.  Despite this, she continues to be hypoxic and as such, request for admission was made.      The patient denies any chest pain.  No abdominal pain or bloody stools.  No measured fevers.  Did have some nausea earlier today.      PAST MEDICAL HISTORY:   1.  COPD.   2.  Hypertension.   3.  Hypothyroidism.   4.  Anxiety.      PAST SURGICAL HISTORY:   1.  Status post wisdom teeth extraction.   2.  Status post right total hip arthroplasty.      ALLERGIES:  AMOXICILLIN.      HOME MEDICATIONS:   1.  Acetaminophen 975 mg every 8 hours.   2.  Albuterol 1-2 puffs every 4 hours p.r.n.   3.  Hydrochlorothiazide 12.5 mg daily as needed.   4.  Ibuprofen 200 mg a day as needed.   5.  Levothyroxine 100 mcg a day.   6.  Lisinopril 40 mg a day.   7.  Vitamin D 1000 units a day.      SOCIAL HISTORY:  The patient smokes about 3-5 cigarettes a day.  The patient smoked for 50 years up to a pack a day.  Has alcohol about twice a week.  She is .   "Has 2 children.  She previously was working in financial services, but currently unemployed.      FAMILY HISTORY:  Reviewed and not felt to be contributory.      REVIEW OF SYSTEMS:  As in HPI, otherwise 10-point review of systems negative.      PHYSICAL EXAMINATION:   VITAL SIGNS:  Temperature 98.5, heart rate 92, blood pressure 175/94, respiratory rate 16, O2 saturations 95% on 2 liters.   GENERAL:  Alert and oriented 69-year-old female patient lying in bed.  Fatigued-appearing, otherwise conversant and friendly.   HEENT:  Pupils are equal, round, reactive.  No scleral icterus or conjunctival injection.  Oropharynx reveals no erythema or exudate.   NECK:  No bruits, JVD or adenopathy.   HEART:  Regular rhythm without murmurs, rubs or gallops.   LUNGS:  Diminished at the bases.  No crackles.  Scattered rhonchi and wheezes.   ABDOMEN:  Soft, nontender, nondistended.  Positive bowel sounds.  No femoral bruits.   EXTREMITIES:  No edema.  Palpable dorsalis pedis pulses bilaterally.   NEUROLOGIC:  No gross focal motor or sensory deficits.      LABORATORY:  BMP is normal.  Glucose slightly elevated at 139.  CBC is normal.  Influenza A and B testing was positive for influenza A.        IMAGING:  Chest x-ray did not show any acute findings.      ASSESSMENT AND PLAN:    Mrs. Jayshree Hastings is a 69-year-old female patient with history including COPD with continued tobacco use disorder, hypertension and hypothyroidism, who presents with \"flu-like symptoms\" and found to be hypoxic and with evidence of influenza A.      1. Influenza A respiratory infection.  2. Acute chronic obstructive pulmonary disease exacerbation.  3. Hypoxia due to above.  The patient was having about 2 days of cough with progressive symptoms including fatigue, headache, congestion and nausea.  On initial evaluation here, she was afebrile, but she was hypoxic.  Labs showed normal white count with positive influenza A.  Chest x-ray was negative.  She was " given a dose of Tamiflu.   - At this time, we will continue Tamiflu to complete 10 doses.   - Order IV methylprednisolone 40 mg every 8 hours.    - We will order scheduled DuoNebs QID and PRN.  - Continue oxygen and wean off as able.     4. Benign essential hypertension.  - Continue lisinopril.   - Hold prior to admission hydrochlorothiazide for now, given risk of hypovolemia and electrolyte disturbances in the above setting.     5. Hypothyroidism.  - Continue levothyroxine.     6. Prophylaxis.   - Pneumoboots and ambulation.      CODE STATUS:  FULL CODE.      DISPOSITION:  Anticipate 1-2 days.         FLOWER KHAN JR., MD             D: 2020   T: 2020   MT: ANURADHA      Name:     ERICA SANTANA   MRN:      [id number]        Account:      [id number]   :      1950        Admitted:     2020                   Document: [id number]       cc: Kisha MEJÍA

## 2020-02-02 NOTE — ED TRIAGE NOTES
"\"I feel the worst I ever felt.\"    Patient reports body aches, cough, ears are plugged and weakness.    Patient states symptoms started yesterday. Denies fever.  "

## 2020-02-06 ENCOUNTER — NURSE TRIAGE (OUTPATIENT)
Dept: NURSING | Facility: CLINIC | Age: 70
End: 2020-02-06

## 2020-02-06 NOTE — TELEPHONE ENCOUNTER
On a sterioid since Sunday/is questioning why there is no tapering off / she has 2 tablets left/ the order is for 2 tablets 40 mg/ each day for 5 days/ did call the er/ who sent me to the pharmacy// it is done sometimes when there is a short duration of time/ is provider specific/ info conveyed to her  Hood Perez RN -[phone]

## 2020-03-15 ENCOUNTER — HEALTH MAINTENANCE LETTER (OUTPATIENT)
Age: 70
End: 2020-03-15

## 2020-07-27 ENCOUNTER — HOSPITAL ENCOUNTER (EMERGENCY)
Facility: CLINIC | Age: 70
Discharge: HOME | End: 2020-07-27
Attending: EMERGENCY MEDICINE | Admitting: EMERGENCY MEDICINE
Payer: COMMERCIAL

## 2020-07-27 VITALS
TEMPERATURE: 97.7 F | DIASTOLIC BLOOD PRESSURE: 91 MMHG | OXYGEN SATURATION: 98 % | SYSTOLIC BLOOD PRESSURE: 176 MMHG | RESPIRATION RATE: 18 BRPM

## 2020-07-27 DIAGNOSIS — R00.2 PALPITATIONS: ICD-10-CM

## 2020-07-27 DIAGNOSIS — F41.9 ANXIOUSNESS: ICD-10-CM

## 2020-07-27 LAB
ANION GAP SERPL CALCULATED.3IONS-SCNC: 4 MMOL/L (ref 3–14)
BASOPHILS # BLD AUTO: 0 10E9/L (ref 0–0.2)
BASOPHILS NFR BLD AUTO: 0.5 %
BUN SERPL-MCNC: 14 MG/DL (ref 7–30)
CALCIUM SERPL-MCNC: 8.5 MG/DL (ref 8.5–10.1)
CHLORIDE SERPL-SCNC: 105 MMOL/L (ref 94–109)
CO2 SERPL-SCNC: 27 MMOL/L (ref 20–32)
CREAT SERPL-MCNC: 0.78 MG/DL (ref 0.52–1.04)
DIFFERENTIAL METHOD BLD: NORMAL
EOSINOPHIL # BLD AUTO: 0.3 10E9/L (ref 0–0.7)
EOSINOPHIL NFR BLD AUTO: 4.5 %
ERYTHROCYTE [DISTWIDTH] IN BLOOD BY AUTOMATED COUNT: 13.8 % (ref 10–15)
GFR SERPL CREATININE-BSD FRML MDRD: 76 ML/MIN/{1.73_M2}
GLUCOSE SERPL-MCNC: 115 MG/DL (ref 70–99)
HCT VFR BLD AUTO: 43.5 % (ref 35–47)
HGB BLD-MCNC: 14.3 G/DL (ref 11.7–15.7)
IMM GRANULOCYTES # BLD: 0 10E9/L (ref 0–0.4)
IMM GRANULOCYTES NFR BLD: 0.3 %
LYMPHOCYTES # BLD AUTO: 1.5 10E9/L (ref 0.8–5.3)
LYMPHOCYTES NFR BLD AUTO: 26.3 %
MCH RBC QN AUTO: 30.1 PG (ref 26.5–33)
MCHC RBC AUTO-ENTMCNC: 32.9 G/DL (ref 31.5–36.5)
MCV RBC AUTO: 92 FL (ref 78–100)
MONOCYTES # BLD AUTO: 0.4 10E9/L (ref 0–1.3)
MONOCYTES NFR BLD AUTO: 7 %
NEUTROPHILS # BLD AUTO: 3.5 10E9/L (ref 1.6–8.3)
NEUTROPHILS NFR BLD AUTO: 61.4 %
NRBC # BLD AUTO: 0 10*3/UL
NRBC BLD AUTO-RTO: 0 /100
PLATELET # BLD AUTO: 212 10E9/L (ref 150–450)
POTASSIUM SERPL-SCNC: 5.3 MMOL/L (ref 3.4–5.3)
RBC # BLD AUTO: 4.75 10E12/L (ref 3.8–5.2)
SODIUM SERPL-SCNC: 136 MMOL/L (ref 133–144)
TROPONIN I SERPL-MCNC: <0.015 UG/L (ref 0–0.04)
WBC # BLD AUTO: 5.7 10E9/L (ref 4–11)

## 2020-07-27 PROCEDURE — 99284 EMERGENCY DEPT VISIT MOD MDM: CPT | Mod: 25

## 2020-07-27 PROCEDURE — 80048 BASIC METABOLIC PNL TOTAL CA: CPT | Performed by: EMERGENCY MEDICINE

## 2020-07-27 PROCEDURE — 85025 COMPLETE CBC W/AUTO DIFF WBC: CPT | Performed by: EMERGENCY MEDICINE

## 2020-07-27 PROCEDURE — 25800030 ZZH RX IP 258 OP 636: Performed by: EMERGENCY MEDICINE

## 2020-07-27 PROCEDURE — 96360 HYDRATION IV INFUSION INIT: CPT

## 2020-07-27 PROCEDURE — 93005 ELECTROCARDIOGRAM TRACING: CPT

## 2020-07-27 PROCEDURE — 84484 ASSAY OF TROPONIN QUANT: CPT | Performed by: EMERGENCY MEDICINE

## 2020-07-27 RX ORDER — LORAZEPAM 1 MG/1
1 TABLET ORAL
Qty: 5 TABLET | Refills: 0 | Status: SHIPPED | OUTPATIENT
Start: 2020-07-27

## 2020-07-27 RX ADMIN — SODIUM CHLORIDE 1000 ML: 9 INJECTION, SOLUTION INTRAVENOUS at 09:15

## 2020-07-27 ASSESSMENT — ENCOUNTER SYMPTOMS: PALPITATIONS: 1

## 2020-07-27 NOTE — ED AVS SNAPSHOT
Emergency Department  [address] [facility] [address]  Phone:  [phone]  Fax:  [phone]                                    Jayshree Hastings   MRN: [id number]    Department:   Emergency Department   Date of Visit:  7/27/2020           After Visit Summary Signature Page    I have received my discharge instructions, and my questions have been answered. I have discussed any challenges I see with this plan with the nurse or doctor.    ..........................................................................................................................................  Patient/Patient Representative Signature      ..........................................................................................................................................  Patient Representative Print Name and Relationship to Patient    ..................................................               ................................................  Date                                   Time    ..........................................................................................................................................  Reviewed by Signature/Title    ...................................................              ..............................................  Date                                               Time          22EPIC Rev 08/18

## 2020-07-27 NOTE — DISCHARGE INSTRUCTIONS
As we discussed, the next upper your palpitations would be to do a Holter monitor.  You have politely chosen to get this done with your regular doctor, which I do think is reasonable.  Please do see your regular doctor as we discussed and come back with any changes.

## 2020-07-27 NOTE — ED PROVIDER NOTES
History     Chief Complaint:    Palpitations      The history is provided by the patient.      Jayshree Hastings is a 69 year old female who presents with palpitations that started yesterday.  She denies pain, denies syncope.  Her symptoms appear to come and go, she does not tell me that she has prolonged runs of palpitations, but rather has one half events throughout the last several days.  She notes that there is a lot of stress at home. She denies chest pain.     Allergies:  Amoxicillin    Medications:    Prinivil  Synthroid    Past Medical History:    Asthma  Hypertension  Hypothyroid    Past Surgical History:    Hip arthroplasty  [place] teeth extraction    Family History:    No past pertinent family history.    Social History:  Smoking Status: Current Smoker  Smokeless Tobacco: Never Used  Alcohol Use: Positive  Drug Use: Negative  PCP: Kisha Diego    Marital Status:       Review of Systems   Cardiovascular: Positive for palpitations. Negative for chest pain.   All other systems reviewed and are negative.      Physical Exam     Patient Vitals for the past 24 hrs:   BP Temp Temp src Resp SpO2   07/27/20 0907 -- 97.7  F (36.5  C) Oral 18 --   07/27/20 0900 (!) 176/91 -- -- -- 98 %     Physical Exam  Vitals: reviewed by me  General: Pt seen on [facility], cooperative, and alert to conversation  Eyes: Tracking well, clear conjunctiva BL  ENT: MMM, midline trachea.   Lungs:   No tachypnea, no accessory muscle use. No respiratory distress.   CV: Rate as above, regular rhythm.    Abd: Soft, non tender, no guarding, no rebound. Non distended  MSK: no peripheral edema or joint effusion.  No evidence of trauma  Skin: No rash, normal turgor and temperature  Neuro: Clear speech and no facial droop.  Psych: Not RIS, no e/o AH/VH      Emergency Department Course     ECG:  Indication: Palpitations  Time: 0825  Vent. Rate 67 bpm. MT interval 144. QRS duration 80. QT/QTc 402/424. P-R-T axis 70 29  40. Normal sinus rhythm. Normal ECG. Read time: 0835    Laboratory:  Laboratory findings were communicated with the patient who voiced understanding of the findings.    CBC: WBC: 5.7, HGB: 14.3, PLT: 212    BMP: Glucose 115 (H) o/w WNL (Creatinine: 0.78)    0913 Troponin: <0.015    Interventions:  0915 NS 1L IV    Emergency Department Course:  Past medical records, nursing notes, and vitals reviewed.    0855 I performed an exam of the patient as documented above.     EKG obtained in the ED, see results above.     IV was inserted and blood was drawn for laboratory testing, results above.    1011 I rechecked the patient and discussed the results of her workup thus far.     Findings and plan explained to the Patient. Patient discharged home with instructions regarding supportive care, medications, and reasons to return. The importance of close follow-up was reviewed. The patient was prescribed Ativan.    I personally reviewed the laboratory results with the Patient and answered all related questions prior to discharge.     Impression & Plan     Medical Decision Making:  Jayshree Hastings is a 69 year old female who presents to the emergency room with several days of palpitations. She has no chest pain, her EKG is unremarkable. She describes what could be PVC's, but there is no sustained arrhythmias that she notes. She also tells me she has a very stressful home situation, which may be contributing to this. She has a normal workup here, normal lab, normal electrolytes. She has been monitored on telemetry for 2 hours with no issues. She is stable from a vital signs perspective and appears to be ambulatory with strong steady gait. No syncope or near syncope. I do think she is stable to be discharged home and follow in the outpatient setting, she does need to follow with regular care doctor in the days ahead. Red flags to come back, talked about a Holter monitor as well, and she prefers to seek this modality through her  regular care provider instead of waiting here in the ER, though I did offer.  She is also asking for a small amount of Ativan, which I think is reasonable..      Diagnosis:    ICD-10-CM    1. Palpitations  R00.2    2. Anxiousness  F41.9        Disposition:  Discharged to home.    Discharge Medications:  New Prescriptions    LORAZEPAM (ATIVAN) 1 MG TABLET    Take 1 tablet (1 mg) by mouth nightly as needed for anxiety       Scribe Disclosure:  I, Moise Arrington, am serving as a scribe at 9:07 AM on 7/27/2020 to document services personally performed by Torres Lewis* based on my observations and the provider's statements to me.        Torres Lewis MD  07/27/20 8698

## 2021-01-14 ENCOUNTER — HEALTH MAINTENANCE LETTER (OUTPATIENT)
Age: 71
End: 2021-01-14

## 2021-04-05 ENCOUNTER — MEDICAL CORRESPONDENCE (OUTPATIENT)
Dept: HEALTH INFORMATION MANAGEMENT | Facility: CLINIC | Age: 71
End: 2021-04-05

## 2021-04-19 DIAGNOSIS — E78.5 HYPERLIPIDEMIA, UNSPECIFIED HYPERLIPIDEMIA TYPE: Primary | ICD-10-CM

## 2021-04-27 ENCOUNTER — HOSPITAL ENCOUNTER (OUTPATIENT)
Dept: CARDIOLOGY | Facility: CLINIC | Age: 71
Discharge: HOME | End: 2021-04-27
Attending: PHYSICIAN ASSISTANT | Admitting: PHYSICIAN ASSISTANT
Payer: COMMERCIAL

## 2021-04-27 DIAGNOSIS — E78.5 HYPERLIPIDEMIA, UNSPECIFIED HYPERLIPIDEMIA TYPE: ICD-10-CM

## 2021-04-27 PROCEDURE — 75571 CT HRT W/O DYE W/CA TEST: CPT

## 2021-04-27 PROCEDURE — 75571 CT HRT W/O DYE W/CA TEST: CPT | Mod: 26 | Performed by: INTERNAL MEDICINE

## 2021-05-08 ENCOUNTER — HEALTH MAINTENANCE LETTER (OUTPATIENT)
Age: 71
End: 2021-05-08

## 2021-07-03 ENCOUNTER — HEALTH MAINTENANCE LETTER (OUTPATIENT)
Age: 71
End: 2021-07-03

## 2021-07-23 ENCOUNTER — HOSPITAL ENCOUNTER (EMERGENCY)
Facility: CLINIC | Age: 71
Discharge: HOME | End: 2021-07-23
Attending: EMERGENCY MEDICINE | Admitting: EMERGENCY MEDICINE
Payer: COMMERCIAL

## 2021-07-23 VITALS
SYSTOLIC BLOOD PRESSURE: 144 MMHG | TEMPERATURE: 96.8 F | OXYGEN SATURATION: 98 % | DIASTOLIC BLOOD PRESSURE: 94 MMHG | RESPIRATION RATE: 16 BRPM | HEART RATE: 78 BPM

## 2021-07-23 DIAGNOSIS — R42 VERTIGO: ICD-10-CM

## 2021-07-23 LAB
ALBUMIN SERPL-MCNC: 3.5 G/DL (ref 3.4–5)
ALP SERPL-CCNC: 92 U/L (ref 40–150)
ALT SERPL W P-5'-P-CCNC: 22 U/L (ref 0–50)
ANION GAP SERPL CALCULATED.3IONS-SCNC: 3 MMOL/L (ref 3–14)
AST SERPL W P-5'-P-CCNC: 6 U/L (ref 0–45)
ATRIAL RATE - MUSE: 67 BPM
BASOPHILS # BLD AUTO: 0 10E3/UL (ref 0–0.2)
BASOPHILS NFR BLD AUTO: 0 %
BILIRUB SERPL-MCNC: 0.4 MG/DL (ref 0.2–1.3)
BUN SERPL-MCNC: 18 MG/DL (ref 7–30)
CALCIUM SERPL-MCNC: 8.7 MG/DL (ref 8.5–10.1)
CHLORIDE BLD-SCNC: 106 MMOL/L (ref 94–109)
CO2 SERPL-SCNC: 29 MMOL/L (ref 20–32)
CREAT SERPL-MCNC: 0.97 MG/DL (ref 0.52–1.04)
DIASTOLIC BLOOD PRESSURE - MUSE: NORMAL MMHG
EOSINOPHIL # BLD AUTO: 0.1 10E3/UL (ref 0–0.7)
EOSINOPHIL NFR BLD AUTO: 1 %
ERYTHROCYTE [DISTWIDTH] IN BLOOD BY AUTOMATED COUNT: 14.3 % (ref 10–15)
GFR SERPL CREATININE-BSD FRML MDRD: 59 ML/MIN/1.73M2
GLUCOSE BLD-MCNC: 94 MG/DL (ref 70–99)
HCT VFR BLD AUTO: 43 % (ref 35–47)
HGB BLD-MCNC: 13.6 G/DL (ref 11.7–15.7)
IMM GRANULOCYTES # BLD: 0.1 10E3/UL
IMM GRANULOCYTES NFR BLD: 1 %
INTERPRETATION ECG - MUSE: NORMAL
LYMPHOCYTES # BLD AUTO: 2.4 10E3/UL (ref 0.8–5.3)
LYMPHOCYTES NFR BLD AUTO: 21 %
MCH RBC QN AUTO: 29.4 PG (ref 26.5–33)
MCHC RBC AUTO-ENTMCNC: 31.6 G/DL (ref 31.5–36.5)
MCV RBC AUTO: 93 FL (ref 78–100)
MONOCYTES # BLD AUTO: 0.9 10E3/UL (ref 0–1.3)
MONOCYTES NFR BLD AUTO: 8 %
NEUTROPHILS # BLD AUTO: 7.8 10E3/UL (ref 1.6–8.3)
NEUTROPHILS NFR BLD AUTO: 69 %
NRBC # BLD AUTO: 0 10E3/UL
NRBC BLD AUTO-RTO: 0 /100
P AXIS - MUSE: 69 DEGREES
PLATELET # BLD AUTO: 312 10E3/UL (ref 150–450)
POTASSIUM BLD-SCNC: 4.3 MMOL/L (ref 3.4–5.3)
PR INTERVAL - MUSE: 140 MS
PROT SERPL-MCNC: 7.2 G/DL (ref 6.8–8.8)
QRS DURATION - MUSE: 72 MS
QT - MUSE: 396 MS
QTC - MUSE: 418 MS
R AXIS - MUSE: 21 DEGREES
RBC # BLD AUTO: 4.62 10E6/UL (ref 3.8–5.2)
SODIUM SERPL-SCNC: 138 MMOL/L (ref 133–144)
SYSTOLIC BLOOD PRESSURE - MUSE: NORMAL MMHG
T AXIS - MUSE: 48 DEGREES
TROPONIN I SERPL-MCNC: <0.015 UG/L (ref 0–0.04)
VENTRICULAR RATE- MUSE: 67 BPM
WBC # BLD AUTO: 11.3 10E3/UL (ref 4–11)

## 2021-07-23 PROCEDURE — 99284 EMERGENCY DEPT VISIT MOD MDM: CPT

## 2021-07-23 PROCEDURE — 85025 COMPLETE CBC W/AUTO DIFF WBC: CPT | Performed by: EMERGENCY MEDICINE

## 2021-07-23 PROCEDURE — 84484 ASSAY OF TROPONIN QUANT: CPT | Performed by: EMERGENCY MEDICINE

## 2021-07-23 PROCEDURE — 82247 BILIRUBIN TOTAL: CPT | Performed by: EMERGENCY MEDICINE

## 2021-07-23 PROCEDURE — 93005 ELECTROCARDIOGRAM TRACING: CPT

## 2021-07-23 PROCEDURE — 82040 ASSAY OF SERUM ALBUMIN: CPT | Performed by: EMERGENCY MEDICINE

## 2021-07-23 PROCEDURE — 36415 COLL VENOUS BLD VENIPUNCTURE: CPT | Performed by: EMERGENCY MEDICINE

## 2021-07-23 RX ORDER — MECLIZINE HYDROCHLORIDE 25 MG/1
25 TABLET ORAL 3 TIMES DAILY PRN
Qty: 25 TABLET | Refills: 0 | Status: SHIPPED | OUTPATIENT
Start: 2021-07-23

## 2021-07-23 ASSESSMENT — ENCOUNTER SYMPTOMS
NUMBNESS: 0
DIZZINESS: 1
SPEECH DIFFICULTY: 0
WEAKNESS: 0

## 2021-07-23 NOTE — ED PROVIDER NOTES
"  History   Chief Complaint:  Dizziness       HPI   Jayshree Hastings is a 70 year old female with history of dizziness, asthma and hypertension who presents with dizziness, taking antivert twice a day to help and a Cortizone shot, but has since worsened. She explained that this morning she was drinking her coffee and smoking prior to taking antivert when she suddenly felt like she was \"going to fall down\" and was \"spinning\". She noted spinning and diaphoresis is consistent with prior episodes. After this episode, she took antivert which helped improve symptoms, and called her . She also complained of congestion but denied chest pain, shortness of breath, tingling, weakness or numbness, or speech difficulty. She received her second Covid vaccination March 31.     She noted she has reacted to medication changes historically, developing a rash from manufacturing change of levothyroxine. She also mentioned she changed medication dosage for blood pressure and has been feeling uneasy since. She also noted she had hip surgery a few years ago and regularly receives Cortizone injections in her knee and shoulder.    Review of Systems   HENT: Positive for congestion.    Cardiovascular: Negative for chest pain.   Neurological: Positive for dizziness. Negative for speech difficulty, weakness and numbness.         Allergies:  Amoxicillin    Medications:  Proventil  Aspirin 81 mg  Levothyroxine  Lisinopril  Ativan  Deltasone    Past Medical History:    Asthma  Hypertension  Hypothyroid    Past Surgical History:    Hip arthroplasty  [place] teeth extraction    Social History:  Presents to the ED with .   Smoker.    Physical Exam     Patient Vitals for the past 24 hrs:   BP Temp Temp src Pulse Resp SpO2   07/23/21 1048 (!) 155/100 -- -- 78 -- --   07/23/21 0917 (!) 179/92 96.8  F (36  C) Temporal 94 16 99 %       Physical Exam  General: Resting on the gurney, appears comfortable  Head:  The scalp, face, and head " appear normal  Mouth/Throat: Mucus membranes are moist  CV:  Regular rate    Normal S1 and S2  No pathological murmur   Resp:  Breath sounds clear and equal bilaterally    Non-labored, no retractions or accessory muscle use    No coarseness    No wheezing   GI:  Abdomen is soft, no rigidity    No tenderness to palpation  MS:  Normal motor assessment of all extremities.    Good capillary refill noted.    Skin:   No rash or lesions noted.  Neuro:  Speech is normal and fluent. No apparent deficit.  Cranial nerves 2-12 intact. Sensation and strength intact x4. Normal heel scrape. Normal rapid alternating movements. Normal gate.  Psych: Awake. Alert.  Normal affect.      Appropriate interactions.      Emergency Department Course   ECG  ECG taken at 1059  Normal sinus rhythm  Normal ECG   Rate 67 bpm. DC interval 140 ms. QRS duration 72 ms. QT/QTc 396/418 ms. P-R-T axes 69 21 48.     Laboratory:  CBC: WBC 11.3 (H), HGB 13.6,     CMP: GFR: 59 (L) o/w WNL (Creatinine 0.97)     Troponin (Collected 1055): <0.015     Emergency Department Course:    Reviewed:  I reviewed nursing notes, vitals, past medical history and care everywhere    Assessments:  1026 I obtained history and examined the patient as noted above.   1200 I rechecked the patient and explained findings.     Disposition:  The patient was discharged to home.       Impression & Plan     Medical Decision Making:  Jayshree Hastings is a 70 year old female who presents for evaluation of vertigo. The differential diagnosis of vertigo is broad and includes common etiologies such as menieres disease, labyrinthitis, benign positional vertigo, otitis media, etc.  More serious etiologies considered include central etiologies such as tumor, intracerebral bleed, dissection, ischemic cerebral vascular accident.  The history, physical exam including detailed neurologic exam, and workup in the emergency room suggests a benign cause of vertigo today.  Patient feels  improved after interventions noted above. Further outpatient management is indicated with vertigo medications.       No indication for advanced imaging at this point (CT/MRI) as there are no definite signs of a central and concerning etiology for the vertigo.      Vertigo precautions given for home.       Diagnosis:    ICD-10-CM    1. Vertigo  R42 Physical Therapy Referral       Discharge Medications:  New Prescriptions    MECLIZINE (ANTIVERT) 25 MG TABLET    Take 1 tablet (25 mg) by mouth 3 times daily as needed for dizziness       Scribe Disclosure:  Rosy GUARDADO, am serving as a scribe at 10:26 AM on 7/23/2021 to document services personally performed by Rosie Graf MD based on my observations and the provider's statements to me.            Rosie Graf MD  07/23/21 6869

## 2021-07-23 NOTE — ED TRIAGE NOTES
On meclazine for vertigo. Early this week had cortisone shot and experienced dizziness later that night. Saw PCP this week and was found to have water in her ears. Today had a major dizzy spell that lasted a couple minutes and felt like she was going to pass out.

## 2021-08-18 ENCOUNTER — HOSPITAL ENCOUNTER (OUTPATIENT)
Dept: MAMMOGRAPHY | Facility: CLINIC | Age: 71
Discharge: HOME | End: 2021-08-18
Attending: PHYSICIAN ASSISTANT | Admitting: PHYSICIAN ASSISTANT
Payer: COMMERCIAL

## 2021-08-18 DIAGNOSIS — Z12.31 VISIT FOR SCREENING MAMMOGRAM: ICD-10-CM

## 2021-08-18 PROCEDURE — 77067 SCR MAMMO BI INCL CAD: CPT

## 2021-08-24 ENCOUNTER — HOSPITAL ENCOUNTER (OUTPATIENT)
Dept: PHYSICAL THERAPY | Facility: CLINIC | Age: 71
Setting detail: THERAPIES SERIES
End: 2021-08-24
Attending: EMERGENCY MEDICINE
Payer: COMMERCIAL

## 2021-08-24 DIAGNOSIS — R42 VERTIGO: ICD-10-CM

## 2021-08-24 PROCEDURE — 97161 PT EVAL LOW COMPLEX 20 MIN: CPT | Mod: GP | Performed by: PHYSICAL THERAPIST

## 2021-08-30 ENCOUNTER — HOSPITAL ENCOUNTER (OUTPATIENT)
Dept: PHYSICAL THERAPY | Facility: CLINIC | Age: 71
Setting detail: THERAPIES SERIES
End: 2021-08-30
Payer: COMMERCIAL

## 2021-08-30 ENCOUNTER — TRANSCRIBE ORDERS (OUTPATIENT)
Dept: OTHER | Age: 71
End: 2021-08-30

## 2021-08-30 DIAGNOSIS — R42 VERTIGO: ICD-10-CM

## 2021-08-30 DIAGNOSIS — H81.10 BPPV (BENIGN PAROXYSMAL POSITIONAL VERTIGO): Primary | ICD-10-CM

## 2021-08-30 PROCEDURE — 95992 CANALITH REPOSITIONING PROC: CPT | Mod: GP | Performed by: PHYSICAL THERAPIST

## 2021-08-30 NOTE — PROGRESS NOTES
[facility]        OUTPATIENT PHYSICAL THERAPY FUNCTIONAL EVALUATION  PLAN OF TREATMENT FOR OUTPATIENT REHABILITATION  (COMPLETE FOR INITIAL CLAIMS ONLY)  Patient's Last Name, First Name, M.I.  YOB: 1950  TiffaniesJayshree     Provider's Name   [facility]   Medical Record No.  [id number]     Start of Care Date:  08/24/21   Onset Date:  07/23/21   Type:     _X__PT   ____OT  ____SLP Medical Diagnosis:  Vertigo R42, BPPV     PT Diagnosis:  Vertigo with impaired balance/gait stability Visits from SOC:  1                              __________________________________________________________________________________  Plan of Treatment/Functional Goals:  balance training, gait training, neuromuscular re-education, other (see comments) (Canalith repositioning maneuvers)           GOALS  BPPV  Pt will demonstrate negative s/s of BPPV via IR positional exam indicating resolution of current problem and return to PLOF.  10/05/21    DHI  Pt will score < 5/100 on the DHI indicating significant improvement in percieved dizziness symptoms and improved QOL (baseline score 20/100).  10/05/21    Activity  Pt will demonstrate asymptomatic tolerance with bending forward, looking up, and rolling in/out of bed indicating resolution of current problem and return to unrestricted PLOF.  10/05/21          Therapy Frequency:  1 time/week   Predicted Duration of Therapy Intervention:  4-6 wks    Vladimir Posada, PT                                    I CERTIFY THE NEED FOR THESE SERVICES FURNISHED UNDER        THIS PLAN OF TREATMENT AND WHILE UNDER MY CARE .             Physician Signature               Date    X_____________________________________________________                      Certification Date From:  08/24/21   Certification Date To:  10/23/21    Referring Provider:  Dr. Graf (  Spoke with pt cert being sent to PCP Kisha Diego PA-C ([facility]))    Initial Assessment  See Epic Evaluation- Start of Care Date: 08/24/21

## 2021-08-30 NOTE — PROGRESS NOTES
08/24/21 0857   Quick Adds   Quick Adds Certification;Vestibular Eval   Type of Visit Initial OP PT Evaluation   General Information   Start of Care Date 08/24/21   Referring Physician Dr. Graf  (PCP Kisha Diego PA-C ([facility]))   Orders Evaluate and Treat as Indicated   Order Date 07/23/21   Medical Diagnosis Vertigo R42, BPPV  (additional orders with BPPV diagnosis sent to PCP 8/24/21)   Onset of illness/injury or Date of Surgery 07/23/21   Precautions/Limitations no known precautions/limitations   Surgical/Medical history reviewed Yes   Pertinent history of current problem (include personal factors and/or comorbidities that impact the POC) Pt presents to PT address acute onset vertigo that began late July, 2021 after recieving cortisone injection for her shoulder/knee.  Felt diaphoretic with episodic vertigo when lying down/moving her head.  Made it through the next couple days, but noticed severe dizziness again on 7/23/21, was seen in ED. Treated symptomatically and was discharged home with Rx for Meclizine.  Since dc, she has felt somewhat improved, still taking Meclizine prn and Zyrtec for possible allergies/sinus congestion.  PMH including dizziness, asthma, anxiety, HTN.  She denies any falls or head injury, denies tinnitus or overt ear pressure, does endorse a mild sense of buzzing in her ears when she lays down/sleeps.     Prior level of function comment Independent PLOF, has been somewhat limited/cautious with activity during current dizziness flare.  Dancing/stretching in the past.   Current Community Support   (spouse)   Patient role/Employment history Unemployed   Living environment House/[facility]   Home/Community Accessibility Comments no access issues reported, stairs inside home   Assistive Devices Comments none   Patient/Family Goals Statement resolve vertigo   Fall Risk Screen   Fall screen completed by PT   Have you fallen 2 or more times in the past year? No   Have  you fallen and had an injury in the past year? No   Is patient a fall risk? No   Fall screen comments low fall risk   Abuse Screen (yes response referral indicated)   Feels Unsafe at Home or Work/School no   Feels Threatened by Someone no   Does Anyone Try to Keep You From Having Contact with Others or Doing Things Outside Your Home? no   Physical Signs of Abuse Present no   System Outcome Measures   Outcome Measures BPPV   Dizziness Handicap Inventory (score out of 100) A decrease in score by 17.18 or greater indicates a clinically significant change in symptoms. 20   Pain   Patient currently in pain No   Cognitive Status Examination   Orientation orientation to person, place and time   Posture   Posture Forward head position   Range of Motion (ROM)   ROM Comment WFL all extremities and CROM, end range cervical ROM pain/tightness reported   Strength   Strength Comments WFL, grossly symmetrical strength in extremities   Bed Mobility   Bed Mobility Comments Independent, vertigo lying on R and DHP positioning   Transfer Skills   Transfer Comments Independent   Gait   Gait Comments Independent ambulation, reciprocal gait pattern slightly slowed speed.  No AD, low fall risk.   Gait Special Tests   Gait Special Tests DYNAMIC GAIT INDEX   Gait Special Tests Dynamic Gait Index   Comments 10/12 4-item DGI   Balance   Balance Comments WFL static standing balance, increased challenge noted with EC condition. Overall low fall risk with mild balance impairment noted.   Balance Special Tests   Balance Special Tests Modified CTSIB Conditions   Balance Special Tests Modified CTSIB Conditions   Condition 1, seconds 30 Seconds   Condition 2, seconds 30 Seconds  (mild sway, subjective report of difficulty/instability)   Sensory Examination   Sensory Perception no deficits were identified   Coordination   Coordination no deficits were identified   Cervicogenic Screen   Neck ROM WFL for positional testing   Oculomotor Exam   Smooth  Pursuit Normal   Saccades Normal   VOR Normal   Rapid Head Thrust Normal   Convergence Testing Normal   Infrared Goggle Exam or Frenzel Lense Exam   Vestibular Suppressant in Last 24 Hours? No   Exam completed with Infrared Goggles   Spontaneous Nystagmus Negative   Gaze Evoked Nystagmus Negative   Head Shake Horizontal Nystagmus Other   Head Shake Horizontal Nystagmus comments no objective nystagmus noted, although mild dizziness reported. Reporting anxiety with this test.   Gianni-Hallpike (right) Other   Purling-Hallpike (right) comments difficult to visual as pt closing eyes, but subjectively reporting severe vertigo lasting ~10-12'' with R DHP position.  On 2nd attempt, noting mild torsional nystagmus but primarily L beating, lasting 10-15''.   Gianni-Hallpike (Left) Horizontal R   Gianni-Hallpike (left) comments mild R beating nystagmus, very subtle dizziness reported   HSCC Supine Roll Test (Right) Horizontal L   HSCC Supine Roll Test (Right) Comments mild ageotropic nystagmus, minimal dizziness reported   HSCC Supine Roll Test (Left) Horizontal R   HSCC Supine Roll Test (Left) Comments  mild ageotropic nystagmus, minimal dizziness reported   BPPV Canal(s) R Posterior   BPPV Type Canalithasis   Other Infrared Goggle Exam or Frenzel Lense Exam Comments possible multi canal involvement, difficullt to visualize as patient closing eyes with symptomatic vertigo during DHP positioning   Planned Therapy Interventions   Planned Therapy Interventions balance training;gait training;neuromuscular re-education;other (see comments)  (Canalith repositioning maneuvers)   Clinical Impression   Criteria for Skilled Therapeutic Interventions Met yes, treatment indicated   PT Diagnosis Vertigo with impaired balance/gait stability   Influenced by the following impairments Vertigo, impaired balance   Functional limitations due to impairments Impaired stability/safety and tolerance with activities requiring head movments and positional change,  impaired ADLs and gait stability   Clinical Presentation Stable/Uncomplicated   Clinical Presentation Rationale PMH, PLOF, low fall risk   Clinical Decision Making (Complexity) Low complexity   Therapy Frequency 1 time/week   Predicted Duration of Therapy Intervention (days/wks) 4-6 wks   Risk & Benefits of therapy have been explained Yes   Patient, Family & other staff in agreement with plan of care Yes   Clinical Impression Comments Vestibular exam reveals s/s most likely consistent with right sided BPPV.  Mild dynamic gait/balance impairment noted, but overall at low risk for falls.  Will benefit from skilled PT for canalith repositioning maneuvers and ongoing reassessment.   Education Assessment   Barriers to Learning No barriers   GOALS   PT Eval Goals 1;2;3   Goal 1   Goal Identifier BPPV   Goal Description Pt will demonstrate negative s/s of BPPV via IR positional exam indicating resolution of current problem and return to PLOF.   Target Date 10/05/21   Goal 2   Goal Identifier DHI   Goal Description Pt will score < 5/100 on the DHI indicating significant improvement in percieved dizziness symptoms and improved QOL (baseline score 20/100).   Target Date 10/05/21   Goal 3   Goal Identifier Activity   Goal Description Pt will demonstrate asymptomatic tolerance with bending forward, looking up, and rolling in/out of bed indicating resolution of current problem and return to unrestricted PLOF.   Target Date 10/05/21   Total Evaluation Time   PT Eval, Low Complexity Minutes ([id number]) 40   Therapy Certification   Certification date from 08/24/21   Certification date to 10/23/21   Medical Diagnosis Vertigo R42, BPPV

## 2021-09-16 NOTE — PROGRESS NOTES
Outpatient Physical Therapy Discharge Note     Patient: Jayshree Hastings  : 1950    Beginning/End Dates of Reporting Period:  21 to 21  Patient was seen x 2 visits to address vertigo concerns during this time period.  Patient emailed today indicating full resolution of symptoms with no further needs.    Referring Provider: Dr. Graf    Therapy Diagnosis: Vertigo with impaired balance/gait stability     Client Self Report: Feeling about the same, noticing episodic vertigo when she lays down, expecially rolling to her right side.        Goals:  Goal Identifier BPPV   Goal Description Pt will demonstrate negative s/s of BPPV via IR positional exam indicating resolution of current problem and return to PLOF.   Target Date 10/05/21   Date Met      Progress (detail required for progress note):  Patient did not schedule any follow up visits, indicating symptoms have fully resolved via email.     Goal Identifier DHI   Goal Description Pt will score < 5/100 on the DHI indicating significant improvement in percieved dizziness symptoms and improved QOL (baseline score 20/100).   Target Date 10/05/21   Date Met      Progress (detail required for progress note):  Patient did not schedule any follow up visits, indicating symptoms have fully resolved via email.     Goal Identifier Activity   Goal Description Pt will demonstrate asymptomatic tolerance with bending forward, looking up, and rolling in/out of bed indicating resolution of current problem and return to unrestricted PLOF.   Target Date 10/05/21   Date Met      Progress (detail required for progress note):  Patient did not schedule any follow up visits, indicating symptoms have fully resolved via email.       Plan:  Discharge from therapy.    Discharge:    Reason for Discharge: Patient chooses to discontinue therapy, symptoms fully resolved.    Equipment Issued: none    Discharge Plan: Patient to continue home program.

## 2021-10-23 ENCOUNTER — HEALTH MAINTENANCE LETTER (OUTPATIENT)
Age: 71
End: 2021-10-23

## 2022-02-28 ENCOUNTER — HOSPITAL ENCOUNTER (EMERGENCY)
Facility: CLINIC | Age: 72
Discharge: HOME | End: 2022-02-28
Attending: EMERGENCY MEDICINE | Admitting: EMERGENCY MEDICINE
Payer: COMMERCIAL

## 2022-02-28 VITALS
TEMPERATURE: 97.5 F | RESPIRATION RATE: 15 BRPM | HEIGHT: 64 IN | BODY MASS INDEX: 30.73 KG/M2 | WEIGHT: 180 LBS | DIASTOLIC BLOOD PRESSURE: 110 MMHG | HEART RATE: 77 BPM | OXYGEN SATURATION: 97 % | SYSTOLIC BLOOD PRESSURE: 184 MMHG

## 2022-02-28 DIAGNOSIS — E86.0 DEHYDRATION: ICD-10-CM

## 2022-02-28 DIAGNOSIS — R55 VASOVAGAL NEAR SYNCOPE: ICD-10-CM

## 2022-02-28 LAB
ALBUMIN SERPL-MCNC: 3.2 G/DL (ref 3.4–5)
ALBUMIN UR-MCNC: NEGATIVE MG/DL
ALP SERPL-CCNC: 85 U/L (ref 40–150)
ALT SERPL W P-5'-P-CCNC: 15 U/L (ref 0–50)
ANION GAP SERPL CALCULATED.3IONS-SCNC: 4 MMOL/L (ref 3–14)
APPEARANCE UR: ABNORMAL
AST SERPL W P-5'-P-CCNC: 9 U/L (ref 0–45)
ATRIAL RATE - MUSE: 75 BPM
BASOPHILS # BLD AUTO: 0 10E3/UL (ref 0–0.2)
BASOPHILS NFR BLD AUTO: 1 %
BILIRUB SERPL-MCNC: 0.4 MG/DL (ref 0.2–1.3)
BILIRUB UR QL STRIP: NEGATIVE
BUN SERPL-MCNC: 12 MG/DL (ref 7–30)
CALCIUM SERPL-MCNC: 8.7 MG/DL (ref 8.5–10.1)
CHLORIDE BLD-SCNC: 105 MMOL/L (ref 94–109)
CO2 SERPL-SCNC: 28 MMOL/L (ref 20–32)
COLOR UR AUTO: ABNORMAL
CREAT SERPL-MCNC: 0.95 MG/DL (ref 0.52–1.04)
DIASTOLIC BLOOD PRESSURE - MUSE: NORMAL MMHG
EOSINOPHIL # BLD AUTO: 0.2 10E3/UL (ref 0–0.7)
EOSINOPHIL NFR BLD AUTO: 2 %
ERYTHROCYTE [DISTWIDTH] IN BLOOD BY AUTOMATED COUNT: 13.6 % (ref 10–15)
GFR SERPL CREATININE-BSD FRML MDRD: 63 ML/MIN/1.73M2
GLUCOSE BLD-MCNC: 105 MG/DL (ref 70–99)
GLUCOSE UR STRIP-MCNC: NEGATIVE MG/DL
HCT VFR BLD AUTO: 39.8 % (ref 35–47)
HGB BLD-MCNC: 12.6 G/DL (ref 11.7–15.7)
HGB UR QL STRIP: NEGATIVE
HOLD SPECIMEN: NORMAL
IMM GRANULOCYTES # BLD: 0 10E3/UL
IMM GRANULOCYTES NFR BLD: 0 %
INTERPRETATION ECG - MUSE: NORMAL
KETONES UR STRIP-MCNC: ABNORMAL MG/DL
LEUKOCYTE ESTERASE UR QL STRIP: NEGATIVE
LYMPHOCYTES # BLD AUTO: 1.8 10E3/UL (ref 0.8–5.3)
LYMPHOCYTES NFR BLD AUTO: 24 %
MCH RBC QN AUTO: 29.1 PG (ref 26.5–33)
MCHC RBC AUTO-ENTMCNC: 31.7 G/DL (ref 31.5–36.5)
MCV RBC AUTO: 92 FL (ref 78–100)
MONOCYTES # BLD AUTO: 0.6 10E3/UL (ref 0–1.3)
MONOCYTES NFR BLD AUTO: 8 %
NEUTROPHILS # BLD AUTO: 4.8 10E3/UL (ref 1.6–8.3)
NEUTROPHILS NFR BLD AUTO: 65 %
NITRATE UR QL: NEGATIVE
NRBC # BLD AUTO: 0 10E3/UL
NRBC BLD AUTO-RTO: 0 /100
P AXIS - MUSE: 72 DEGREES
PH UR STRIP: 7 [PH] (ref 5–7)
PLATELET # BLD AUTO: 272 10E3/UL (ref 150–450)
POTASSIUM BLD-SCNC: 4.7 MMOL/L (ref 3.4–5.3)
PR INTERVAL - MUSE: 146 MS
PROT SERPL-MCNC: 6.5 G/DL (ref 6.8–8.8)
QRS DURATION - MUSE: 74 MS
QT - MUSE: 406 MS
QTC - MUSE: 453 MS
R AXIS - MUSE: 17 DEGREES
RBC # BLD AUTO: 4.33 10E6/UL (ref 3.8–5.2)
RBC URINE: 1 /HPF
SODIUM SERPL-SCNC: 137 MMOL/L (ref 133–144)
SP GR UR STRIP: 1.01 (ref 1–1.03)
SQUAMOUS EPITHELIAL: 2 /HPF
SYSTOLIC BLOOD PRESSURE - MUSE: NORMAL MMHG
T AXIS - MUSE: 56 DEGREES
TROPONIN I SERPL HS-MCNC: 5 NG/L
UROBILINOGEN UR STRIP-MCNC: NORMAL MG/DL
VENTRICULAR RATE- MUSE: 75 BPM
WBC # BLD AUTO: 7.3 10E3/UL (ref 4–11)
WBC URINE: 3 /HPF

## 2022-02-28 PROCEDURE — 99284 EMERGENCY DEPT VISIT MOD MDM: CPT | Mod: 25

## 2022-02-28 PROCEDURE — 85025 COMPLETE CBC W/AUTO DIFF WBC: CPT | Performed by: EMERGENCY MEDICINE

## 2022-02-28 PROCEDURE — 81001 URINALYSIS AUTO W/SCOPE: CPT | Performed by: EMERGENCY MEDICINE

## 2022-02-28 PROCEDURE — 84484 ASSAY OF TROPONIN QUANT: CPT | Performed by: EMERGENCY MEDICINE

## 2022-02-28 PROCEDURE — 93005 ELECTROCARDIOGRAM TRACING: CPT

## 2022-02-28 PROCEDURE — 258N000003 HC RX IP 258 OP 636: Performed by: EMERGENCY MEDICINE

## 2022-02-28 PROCEDURE — 80053 COMPREHEN METABOLIC PANEL: CPT | Performed by: EMERGENCY MEDICINE

## 2022-02-28 PROCEDURE — 36415 COLL VENOUS BLD VENIPUNCTURE: CPT | Performed by: EMERGENCY MEDICINE

## 2022-02-28 PROCEDURE — 96360 HYDRATION IV INFUSION INIT: CPT

## 2022-02-28 RX ORDER — LIDOCAINE 40 MG/G
CREAM TOPICAL
Status: DISCONTINUED | OUTPATIENT
Start: 2022-02-28 | End: 2022-02-28 | Stop reason: HOSPADM

## 2022-02-28 RX ADMIN — SODIUM CHLORIDE 1000 ML: 9 INJECTION, SOLUTION INTRAVENOUS at 18:08

## 2022-02-28 ASSESSMENT — ENCOUNTER SYMPTOMS
DIARRHEA: 0
FACIAL ASYMMETRY: 0
HEADACHES: 0
WEAKNESS: 1
VOMITING: 0
NAUSEA: 1
DIZZINESS: 0
SPEECH DIFFICULTY: 0
LIGHT-HEADEDNESS: 1
DIAPHORESIS: 1

## 2022-02-28 NOTE — ED PROVIDER NOTES
History   Chief Complaint:  Lightheadedness and Nausea     The history is provided by the patient.      Jayshree Hastings is a 71 year old female with history of hypertension, vertigo, hypothyroidism and asthma who presents with lightheadedness and nausea. Patient reports that around 1500 today she noticed sudden onset of lightheadedness, weakness, nausea and diaphoresis after standing up from sitting. No vomiting or chest pain. States she has a history of vertigo, but her symptoms today do not feel like vertigo as she is not dizzy. Endorses that she is symptom free in the ED. She did eat today, but did not drink a sufficient amount of liquids. Notes she is urinating a normal amount, but that her urine is dark and foul smelling. No headache, facial droop, visual disturbance, unilateral weakness, speech difficulty or other stroke like symptoms.     Review of Systems   Constitutional: Positive for diaphoresis.   Eyes: Negative for visual disturbance.   Cardiovascular: Negative for chest pain.   Gastrointestinal: Positive for nausea. Negative for diarrhea and vomiting.   Genitourinary: Negative for decreased urine volume.   Neurological: Positive for weakness and light-headedness. Negative for dizziness, facial asymmetry, speech difficulty and headaches.   All other systems reviewed and are negative.    Allergies:  Amoxicillin    Medications:  Lisinopril  Albuterol inhaler  Synthroid    Past Medical History:     Hypertension  Hypothyroidism  Asthma  Vertigo    Past Surgical History:    Right hip arthroplasty    Social History:  Patient presents with Alexander, patient's   Presents via private vehicle      Physical Exam     Patient Vitals for the past 24 hrs:   BP Temp Temp src Pulse Resp SpO2 Height Weight   02/28/22 2000 (!) 184/110 -- -- 77 -- 97 % -- --   02/28/22 1900 (!) 170/103 -- -- 82 15 100 % -- --   02/28/22 1830 (!) 163/102 -- -- 81 13 98 % -- --   02/28/22 1810 (!) 151/108 -- -- 75 11 97 % -- --  "  02/28/22 1730 (!) 144/86 -- -- 75 12 99 % -- --   02/28/22 1710 (!) 157/99 -- -- 79 25 99 % -- --   02/28/22 1704 -- -- -- -- 18 -- -- --   02/28/22 1658 (!) 165/90 97.5  F (36.4  C) Temporal 75 -- 99 % 1.626 m (5' 4\") 81.6 kg (180 lb)       Physical Exam  Nursing note and vitals reviewed.    Constitutional:  Appears comfortable.    HENT:    Nose normal.  No discharge.      Oral mucosa is dry.  Eyes:    Conjunctivae are normal without injection.  Pupils are equal.   Cardiovascular:  Normal rate, regular rhythm with normal S1 and S2.      Normal heart sounds and peripheral pulses 2+ and equal.       No murmur or maria teresa.  Pulmonary:  Effort normal and breath sounds clear to auscultation bilaterally.     No respiratory distress.  No stridor.     No wheezes. No rales.     GI:    Soft. No distension and no mass. No tenderness.   Musculoskeletal:  Normal range of motion. No extremity deformity.     No edema and no tenderness.    Neurological:   Alert and oriented. No focal weakness.  Gait is normal.     Exhibits good muscle tone. Coordination normal.      GCS eye subscore is 4. GCS verbal subscore is 5.      GCS motor subscore is 6. No hand drift or leg drift. No facial droop. Finger to nose normal.  No hand drift.  Skin:    Skin is warm and dry. No rash noted.   Psychiatric:   Behavior is normal. Appropriate mood and affect.     Judgment and thought content normal.     Emergency Department Course   ECG  ECG obtained at 1705, ECG read at 1730  NSR   Rate 75 bpm. NH interval 146 ms. QRS duration 74 ms. QT/QTc 406/453 ms. P-R-T axes 72 17 56.     Laboratory:  Labs Ordered and Resulted from Time of ED Arrival to Time of ED Departure   COMPREHENSIVE METABOLIC PANEL - Abnormal       Result Value    Sodium 137      Potassium 4.7      Chloride 105      Carbon Dioxide (CO2) 28      Anion Gap 4      Urea Nitrogen 12      Creatinine 0.95      Calcium 8.7      Glucose 105 (*)     Alkaline Phosphatase 85      AST 9      ALT 15      " Protein Total 6.5 (*)     Albumin 3.2 (*)     Bilirubin Total 0.4      GFR Estimate 63     ROUTINE UA WITH MICROSCOPIC REFLEX TO CULTURE - Abnormal    Color Urine Light Yellow      Appearance Urine Slightly Cloudy (*)     Glucose Urine Negative      Bilirubin Urine Negative      Ketones Urine Trace (*)     Specific Gravity Urine 1.011      Blood Urine Negative      pH Urine 7.0      Protein Albumin Urine Negative      Urobilinogen Urine Normal      Nitrite Urine Negative      Leukocyte Esterase Urine Negative      RBC Urine 1      WBC Urine 3      Squamous Epithelials Urine 2 (*)    TROPONIN I - Normal    Troponin I High Sensitivity 5     CBC WITH PLATELETS AND DIFFERENTIAL    WBC Count 7.3      RBC Count 4.33      Hemoglobin 12.6      Hematocrit 39.8      MCV 92      MCH 29.1      MCHC 31.7      RDW 13.6      Platelet Count 272      % Neutrophils 65      % Lymphocytes 24      % Monocytes 8      % Eosinophils 2      % Basophils 1      % Immature Granulocytes 0      NRBCs per 100 WBC 0      Absolute Neutrophils 4.8      Absolute Lymphocytes 1.8      Absolute Monocytes 0.6      Absolute Eosinophils 0.2      Absolute Basophils 0.0      Absolute Immature Granulocytes 0.0      Absolute NRBCs 0.0         Procedures    Emergency Department Course:         Reviewed:  I reviewed nursing notes, vitals, past medical history, Care Everywhere and [facility]    Assessments/Consults:  Past medical records, nursing notes, and vitals reviewed.  1753: I performed an exam of the patient and obtained history, as documented above.   1957: Rechecked and updated. Amenable to discharge to home.     Interventions:  1808 NS, 1 L, IV    Disposition:  The patient was discharged to home.     Impression & Plan   Medical Decision Making:  Patient comes in after a near syncopal spell at home.  She was not dizzy.  Her neurologic exam is normal.  She said she was very thirsty and her mouth was dry.  I did check orthostatics and she was not orthostatic.   She was given a liter of fluids and drank a couple glasses of water was feeling much better.  She got up and ambulated to the bathroom and back a few times and had no further symptoms.  She was on the monitor, her EKG was normal and she had no arrhythmias.  Her labs came back essentially normal as well.  I believe this was a vasovagal near syncopal spell.  I am comfortable discharging the patient home and her questions were answered.  She is going to push fluids and if she has further episodes she needs a work-up in the clinic.  If she ever passes out for no reason such as sitting down watching TV or eating, this needs work-up in the emergency room and she will return.    Push fluids, take it easy the next 24 hours.  If you have further episodes, you should be evaluated in the clinic by your doctor.  Otherwise follow-up as needed.  If you ever feel lightheaded again, lay down.    Diagnosis:    ICD-10-CM    1. Vasovagal near syncope  R55    2. Dehydration  E86.0        Scribe Disclosure:  I, Tu Cotton, am serving as a scribe at 5:42 PM on 2/28/2022 to document services personally performed by Liliana Son MD based on my observations and the provider's statements to me.             Liliana Son MD  02/28/22 2029

## 2022-02-28 NOTE — ED TRIAGE NOTES
Patient reports sudden onset of dizziness, weakness, nausea and diaphoresis. Hx of vertigo. Denies chest pain or SOB.

## 2022-03-01 NOTE — DISCHARGE INSTRUCTIONS
Push fluids, take it easy the next 24 hours.  If you have further episodes, you should be evaluated in the clinic by your doctor.  Otherwise follow-up as needed.  If you ever feel lightheaded again, lay down.

## 2022-10-10 ENCOUNTER — HEALTH MAINTENANCE LETTER (OUTPATIENT)
Age: 72
End: 2022-10-10

## 2022-11-27 ENCOUNTER — HEALTH MAINTENANCE LETTER (OUTPATIENT)
Age: 72
End: 2022-11-27

## 2023-07-31 ENCOUNTER — MEDICAL CORRESPONDENCE (OUTPATIENT)
Dept: HEALTH INFORMATION MANAGEMENT | Facility: CLINIC | Age: 73
End: 2023-07-31
Payer: COMMERCIAL

## 2023-08-01 DIAGNOSIS — E78.5 HYPERLIPEMIA: Primary | ICD-10-CM

## 2023-08-11 ENCOUNTER — HOSPITAL ENCOUNTER (OUTPATIENT)
Dept: CARDIOLOGY | Facility: CLINIC | Age: 73
Discharge: HOME | End: 2023-08-11
Attending: PHYSICIAN ASSISTANT | Admitting: PHYSICIAN ASSISTANT
Payer: COMMERCIAL

## 2023-08-11 DIAGNOSIS — E78.5 HYPERLIPEMIA: ICD-10-CM

## 2023-08-11 PROCEDURE — 75571 CT HRT W/O DYE W/CA TEST: CPT | Mod: GA

## 2023-08-11 PROCEDURE — 75571 CT HRT W/O DYE W/CA TEST: CPT | Mod: 26 | Performed by: INTERNAL MEDICINE

## 2023-08-18 ENCOUNTER — HOSPITAL ENCOUNTER (OUTPATIENT)
Dept: MAMMOGRAPHY | Facility: CLINIC | Age: 73
Discharge: HOME | End: 2023-08-18
Attending: PHYSICIAN ASSISTANT | Admitting: PHYSICIAN ASSISTANT
Payer: COMMERCIAL

## 2023-08-18 DIAGNOSIS — Z12.31 VISIT FOR SCREENING MAMMOGRAM: ICD-10-CM

## 2023-08-18 PROCEDURE — 77067 SCR MAMMO BI INCL CAD: CPT

## 2024-05-26 ENCOUNTER — HEALTH MAINTENANCE LETTER (OUTPATIENT)
Age: 74
End: 2024-05-26

## 2024-06-06 ENCOUNTER — HOSPITAL ENCOUNTER (EMERGENCY)
Facility: CLINIC | Age: 74
Discharge: HOME | End: 2024-06-06
Attending: EMERGENCY MEDICINE | Admitting: EMERGENCY MEDICINE
Payer: COMMERCIAL

## 2024-06-06 VITALS
BODY MASS INDEX: 31.01 KG/M2 | SYSTOLIC BLOOD PRESSURE: 138 MMHG | DIASTOLIC BLOOD PRESSURE: 78 MMHG | OXYGEN SATURATION: 98 % | TEMPERATURE: 97.6 F | HEIGHT: 63 IN | HEART RATE: 71 BPM | RESPIRATION RATE: 20 BRPM | WEIGHT: 175 LBS

## 2024-06-06 DIAGNOSIS — R42 DIZZINESS AND GIDDINESS: ICD-10-CM

## 2024-06-06 LAB
ANION GAP SERPL CALCULATED.3IONS-SCNC: 9 MMOL/L (ref 7–15)
ATRIAL RATE - MUSE: 70 BPM
BASOPHILS # BLD AUTO: 0 10E3/UL (ref 0–0.2)
BASOPHILS NFR BLD AUTO: 0 %
BUN SERPL-MCNC: 11.5 MG/DL (ref 8–23)
CALCIUM SERPL-MCNC: 8.8 MG/DL (ref 8.8–10.2)
CHLORIDE SERPL-SCNC: 103 MMOL/L (ref 98–107)
CREAT SERPL-MCNC: 0.86 MG/DL (ref 0.51–0.95)
DEPRECATED HCO3 PLAS-SCNC: 28 MMOL/L (ref 22–29)
DIASTOLIC BLOOD PRESSURE - MUSE: NORMAL MMHG
EGFRCR SERPLBLD CKD-EPI 2021: 70 ML/MIN/1.73M2
EOSINOPHIL # BLD AUTO: 0.2 10E3/UL (ref 0–0.7)
EOSINOPHIL NFR BLD AUTO: 2 %
ERYTHROCYTE [DISTWIDTH] IN BLOOD BY AUTOMATED COUNT: 13.4 % (ref 10–15)
GLUCOSE SERPL-MCNC: 169 MG/DL (ref 70–99)
HCT VFR BLD AUTO: 38.1 % (ref 35–47)
HGB BLD-MCNC: 11.9 G/DL (ref 11.7–15.7)
HOLD SPECIMEN: NORMAL
IMM GRANULOCYTES # BLD: 0 10E3/UL
IMM GRANULOCYTES NFR BLD: 0 %
INTERPRETATION ECG - MUSE: NORMAL
LYMPHOCYTES # BLD AUTO: 1.3 10E3/UL (ref 0.8–5.3)
LYMPHOCYTES NFR BLD AUTO: 19 %
MCH RBC QN AUTO: 27.6 PG (ref 26.5–33)
MCHC RBC AUTO-ENTMCNC: 31.2 G/DL (ref 31.5–36.5)
MCV RBC AUTO: 88 FL (ref 78–100)
MONOCYTES # BLD AUTO: 0.4 10E3/UL (ref 0–1.3)
MONOCYTES NFR BLD AUTO: 6 %
NEUTROPHILS # BLD AUTO: 5.1 10E3/UL (ref 1.6–8.3)
NEUTROPHILS NFR BLD AUTO: 73 %
NRBC # BLD AUTO: 0 10E3/UL
NRBC BLD AUTO-RTO: 0 /100
P AXIS - MUSE: 65 DEGREES
PLATELET # BLD AUTO: 306 10E3/UL (ref 150–450)
POTASSIUM SERPL-SCNC: 4.3 MMOL/L (ref 3.4–5.3)
PR INTERVAL - MUSE: 150 MS
QRS DURATION - MUSE: 68 MS
QT - MUSE: 396 MS
QTC - MUSE: 427 MS
R AXIS - MUSE: 42 DEGREES
RBC # BLD AUTO: 4.31 10E6/UL (ref 3.8–5.2)
SODIUM SERPL-SCNC: 140 MMOL/L (ref 135–145)
SYSTOLIC BLOOD PRESSURE - MUSE: NORMAL MMHG
T AXIS - MUSE: 63 DEGREES
VENTRICULAR RATE- MUSE: 70 BPM
WBC # BLD AUTO: 7 10E3/UL (ref 4–11)

## 2024-06-06 PROCEDURE — 85025 COMPLETE CBC W/AUTO DIFF WBC: CPT | Performed by: EMERGENCY MEDICINE

## 2024-06-06 PROCEDURE — 80048 BASIC METABOLIC PNL TOTAL CA: CPT | Performed by: EMERGENCY MEDICINE

## 2024-06-06 PROCEDURE — 99284 EMERGENCY DEPT VISIT MOD MDM: CPT

## 2024-06-06 PROCEDURE — 36415 COLL VENOUS BLD VENIPUNCTURE: CPT | Performed by: EMERGENCY MEDICINE

## 2024-06-06 PROCEDURE — 93005 ELECTROCARDIOGRAM TRACING: CPT

## 2024-06-06 RX ORDER — ONDANSETRON 2 MG/ML
4 INJECTION INTRAMUSCULAR; INTRAVENOUS ONCE
Status: COMPLETED | OUTPATIENT
Start: 2024-06-06 | End: 2024-06-06

## 2024-06-06 RX ORDER — ONDANSETRON 4 MG/1
4 TABLET, ORALLY DISINTEGRATING ORAL EVERY 8 HOURS PRN
Qty: 10 TABLET | Refills: 0 | Status: SHIPPED | OUTPATIENT
Start: 2024-06-06 | End: 2024-06-09

## 2024-06-06 ASSESSMENT — COLUMBIA-SUICIDE SEVERITY RATING SCALE - C-SSRS
1. IN THE PAST MONTH, HAVE YOU WISHED YOU WERE DEAD OR WISHED YOU COULD GO TO SLEEP AND NOT WAKE UP?: NO
6. HAVE YOU EVER DONE ANYTHING, STARTED TO DO ANYTHING, OR PREPARED TO DO ANYTHING TO END YOUR LIFE?: NO
2. HAVE YOU ACTUALLY HAD ANY THOUGHTS OF KILLING YOURSELF IN THE PAST MONTH?: NO

## 2024-06-06 ASSESSMENT — ACTIVITIES OF DAILY LIVING (ADL)
ADLS_ACUITY_SCORE: 38

## 2024-06-06 NOTE — ED PROVIDER NOTES
"  Emergency Department Note      History of Present Illness     Chief Complaint  Dizziness    HPI  Jayshree Hastings is a 73 year old female with a history of hypertension, hyperlipidemia, hypothyroidism, and vertigo who presents with an episode of dizziness. She got up to get something out of the closet and felt like she was going to pass out. She laid down and then she felt nauseous. She fell down walking to the door. They called the ambulance and recommended she present to the ED. They walked her to her car. Her  brought her from home. A few days ago she had a few minutes of dizziness which went away on its own. She is not on a statin for her hyperlipidemia. Turning her head does not impact her dizziness. She had cataract surgery a few months ago. She denies vomiting but notes she is still nauseous in the ED. She adds this does not feel like vertigo she has had in the past.     Independent Historian  None    Review of External Notes  She was seen in the ED for vasovagal syncope 2022. She was seen for dizziness in July of 2021.   Past Medical History   Medical History and Problem List  Allergic rhinitis  Anxiety   Acute respiratory failure with hypoxia   Asthma  BPPV  COPD exacerbation   Hypertension  Hyperlipidemia   Hypothyroid   Smoking     Medications  Albuterol neb solution  Levothyroxine   Lorazepam     Surgical History   Arthroplasty hip, right   [place]     Physical Exam   Patient Vitals for the past 24 hrs:   BP Temp Temp src Pulse Resp SpO2 Height Weight   06/06/24 1345 120/76 97.6  F (36.4  C) Temporal 77 16 98 % 1.6 m (5' 3\") 79.4 kg (175 lb)     Physical Exam  Vitals reviewed.   HENT:      Head: Normocephalic.      Right Ear: Tympanic membrane normal.      Left Ear: Tympanic membrane normal.      Nose: Nose normal.      Mouth/Throat:      Mouth: Mucous membranes are moist.   Eyes:      Extraocular Movements: Extraocular movements intact.      Pupils: Pupils are equal, round, and reactive " to light.   Cardiovascular:      Rate and Rhythm: Normal rate and regular rhythm.   Pulmonary:      Effort: Pulmonary effort is normal.   Abdominal:      General: Abdomen is flat.   Musculoskeletal:         General: Normal range of motion.   Skin:     General: Skin is warm.      Capillary Refill: Capillary refill takes less than 2 seconds.   Neurological:      General: No focal deficit present.      Mental Status: She is alert and oriented to person, place, and time.      Comments: Light lateral nystagmus.  No disconjugate gaze.  Normal finger-nose and normal tandem gait.   Psychiatric:         Mood and Affect: Mood normal.         Diagnostics   Lab Results   Labs Ordered and Resulted from Time of ED Arrival to Time of ED Departure   BASIC METABOLIC PANEL - Abnormal       Result Value    Sodium 140      Potassium 4.3      Chloride 103      Carbon Dioxide (CO2) 28      Anion Gap 9      Urea Nitrogen 11.5      Creatinine 0.86      GFR Estimate 70      Calcium 8.8      Glucose 169 (*)    CBC WITH PLATELETS AND DIFFERENTIAL - Abnormal    WBC Count 7.0      RBC Count 4.31      Hemoglobin 11.9      Hematocrit 38.1      MCV 88      MCH 27.6      MCHC 31.2 (*)     RDW 13.4      Platelet Count 306      % Neutrophils 73      % Lymphocytes 19      % Monocytes 6      % Eosinophils 2      % Basophils 0      % Immature Granulocytes 0      NRBCs per 100 WBC 0      Absolute Neutrophils 5.1      Absolute Lymphocytes 1.3      Absolute Monocytes 0.4      Absolute Eosinophils 0.2      Absolute Basophils 0.0      Absolute Immature Granulocytes 0.0      Absolute NRBCs 0.0         Imaging  No orders to display       Independent Interpretation  None  ED Course    Medications Administered  Medications   ondansetron (ZOFRAN) injection 4 mg (4 mg Intravenous Not Given 6/6/24 2130)       Procedures  Procedures     Discussion of Management  None    Social Determinants of Health adding to complexity of care  None    ED Course  ED Course as of  06/06/24 1354   Thu Jun 06, 2024   1353 I obtained the patient's history and examined as noted above.    1353 I performed a NIH stroke scale.      Medical Decision Making / Diagnosis   CMS Diagnoses: None    MIPS     None    [facility]  Jayshree Hsatings is a 73 year old female presents with dizziness described as balance off and the room spinning and generalized weakness.  Patient has a history of multiple presentations for dizziness in the past.  There is her presentation today seems more like lightheadedness and vertigo.  Patient was seen at triage and evaluated.  Patient waited quite a while to be brought back to her room ultimately symptoms seem to dissipate.  No concerns for stroke or central vertigo.  Lab work is unremarkable.  Patient offered reassurance meclizine as needed and follow-up with primary care.    Disposition  The patient was discharged.     ICD-10 Codes:    ICD-10-CM    1. Dizziness and giddiness  R42            Discharge Medications  Discharge Medication List as of 6/6/2024  5:41 PM        START taking these medications    Details   ondansetron (ZOFRAN ODT) 4 MG ODT tab Take 1 tablet (4 mg) by mouth every 8 hours as needed for nausea or vomiting, Disp-10 tablet, R-0, E-Prescribe           Scribe Disclosure:  I, Elaina Nguyen, am serving as a scribe at 1:56 PM on 6/6/2024 to document services personally performed by Iván Johnson MD based on my observations and the provider's statements to me.      Iván Johnson MD Goodman, Brian Samuel, MD  06/11/24 1600

## 2024-06-06 NOTE — ED TRIAGE NOTES
Patient comes in with complaints of generalized weakness and lightheadedness. States nausea. States was getting something out of the cabinet when she felt like she was going to pass out. Laid on the cough but didn't feel better, called EMS and they checked her out and then her  drove her here. Denies thinners, LOC or head trauma.      Triage Assessment (Adult)       Row Name 06/06/24 8728          Triage Assessment    Airway WDL WDL        Respiratory WDL    Respiratory WDL WDL        Skin Circulation/Temperature WDL    Skin Circulation/Temperature WDL WDL        Cardiac WDL    Cardiac WDL WDL        Peripheral/Neurovascular WDL    Peripheral Neurovascular WDL WDL        Cognitive/Neuro/Behavioral WDL    Cognitive/Neuro/Behavioral WDL WDL

## 2024-06-06 NOTE — DISCHARGE INSTRUCTIONS
Okay to use Zofran when needed for nausea your lab work is all normal.  If you develop double vision severe headache or numbness or weakness of an extremity return to the emergency room for reassessment.  Thanks for your patience and understanding for the long wait today.

## 2024-06-19 ENCOUNTER — HOSPITAL ENCOUNTER (OUTPATIENT)
Dept: CT IMAGING | Facility: CLINIC | Age: 74
Discharge: HOME | End: 2024-06-19
Attending: PHYSICIAN ASSISTANT
Payer: COMMERCIAL

## 2024-06-19 ENCOUNTER — HOSPITAL ENCOUNTER (OUTPATIENT)
Dept: ULTRASOUND IMAGING | Facility: CLINIC | Age: 74
Discharge: HOME | End: 2024-06-19
Attending: PHYSICIAN ASSISTANT
Payer: COMMERCIAL

## 2024-06-19 DIAGNOSIS — I25.10 CAD (CORONARY ARTERY DISEASE): ICD-10-CM

## 2024-06-19 DIAGNOSIS — R42 DIZZINESS: ICD-10-CM

## 2024-06-19 PROCEDURE — 70450 CT HEAD/BRAIN W/O DYE: CPT

## 2024-06-19 PROCEDURE — 93880 EXTRACRANIAL BILAT STUDY: CPT

## 2024-07-15 ENCOUNTER — TELEPHONE (OUTPATIENT)
Dept: FAMILY MEDICINE | Facility: CLINIC | Age: 74
End: 2024-07-15

## 2024-07-15 ENCOUNTER — ANCILLARY PROCEDURE (OUTPATIENT)
Dept: GENERAL RADIOLOGY | Facility: CLINIC | Age: 74
End: 2024-07-15
Attending: NURSE PRACTITIONER
Payer: COMMERCIAL

## 2024-07-15 ENCOUNTER — OFFICE VISIT (OUTPATIENT)
Dept: URGENT CARE | Facility: URGENT CARE | Age: 74
End: 2024-07-15
Payer: COMMERCIAL

## 2024-07-15 VITALS
OXYGEN SATURATION: 96 % | BODY MASS INDEX: 30.47 KG/M2 | TEMPERATURE: 97.8 F | DIASTOLIC BLOOD PRESSURE: 71 MMHG | RESPIRATION RATE: 20 BRPM | HEART RATE: 95 BPM | SYSTOLIC BLOOD PRESSURE: 121 MMHG | WEIGHT: 172 LBS

## 2024-07-15 DIAGNOSIS — U07.1 INFECTION DUE TO 2019 NOVEL CORONAVIRUS: Primary | ICD-10-CM

## 2024-07-15 DIAGNOSIS — R05.1 ACUTE COUGH: ICD-10-CM

## 2024-07-15 PROCEDURE — 99203 OFFICE O/P NEW LOW 30 MIN: CPT | Performed by: NURSE PRACTITIONER

## 2024-07-15 PROCEDURE — 71046 X-RAY EXAM CHEST 2 VIEWS: CPT | Mod: TC | Performed by: RADIOLOGY

## 2024-07-15 RX ORDER — AZITHROMYCIN 250 MG/1
TABLET, FILM COATED ORAL
Qty: 6 TABLET | Refills: 0 | Status: SHIPPED | OUTPATIENT
Start: 2024-07-15 | End: 2024-07-20

## 2024-07-15 NOTE — PROGRESS NOTES
Assessment & Plan     Infection due to 2019 novel coronavirus  - XR Chest 2 Views    Acute cough  - XR Chest 2 Views  - azithromycin (ZITHROMAX) 250 MG tablet  Dispense: 6 tablet; Refill: 0       Patient Instructions     Results for orders placed or performed in visit on 07/15/24   XR Chest 2 Views     Status: None    Narrative    XR CHEST 2 VIEWS 7/15/2024 3:27 PM    HISTORY: Infection due to 2019 novel coronavirus; Acute cough    COMPARISON: 8/11/2023      Impression    IMPRESSION: Mild patchy opacity in the left lung base may be due to  atelectasis or infection/inflammation. No pleural effusion or  pneumothorax. Normal heart size. Tortuous aortic arch.    JESSICA MANDEL MD         SYSTEM ID:  [id number]       CXR with opacity in left base per radiology.    Zpak sent.  Once daily for 5 days.    covid  swab positive 1 week ago.  .    Push fluids  Lots of handwashing.   Ibuprofen as needed for fever or pain  Delsym or dayquil/nyquil for cough as needed     Rest as able.   Will call if any other labs positive.    F/u in the clinic if symptoms persist or worsen.          Return in about 1 week (around 7/22/2024) for with regular provider if symptoms persist.    Dominique Aguillon, MAURICE [facility] NAZ Martell is a 73 year old female who presents to clinic today for the following health issues:  Chief Complaint   Patient presents with    Urgent Care     Tested positive for covid around 7/7. Patient feels fatigue, no appetite,unable to cough up phlegm, stomach bothering her. Just doenst feel right.      HPI    URI Adult    Onset of symptoms was 8 day(s) ago.  Course of illness is same.    Severity moderate  Current and Associated symptoms: runny nose, cough - non-productive, shortness of breath, body aches, fatigue, and nausea  Treatment measures tried include Tylenol/Ibuprofen, OTC Cough med, Fluids, and Rest.  Predisposing factors include ill contact: Family member .  Positive  COVID, did NOT take paxlovid or any antiviral.      Review of Systems  Constitutional, HEENT, cardiovascular, pulmonary, GI, , musculoskeletal, neuro, skin, endocrine and psych systems are negative, except as otherwise noted.      Objective    /71   Pulse 95   Temp 97.8  F (36.6  C) (Tympanic)   Resp 20   Wt 78 kg (172 lb)   SpO2 96%   BMI 30.47 kg/m    Physical Exam   GENERAL: alert and no distress  EYES: Eyes grossly normal to inspection, PERRL and conjunctivae and sclerae normal  HENT: ear canals and TM's normal, nose and mouth without ulcers or lesions  NECK: no adenopathy, no asymmetry, masses, or scars  RESP: lungs clear to auscultation - no rales, rhonchi or wheezes  CV: regular rate and rhythm, normal S1 S2, no S3 or S4, no murmur, click or rub, no peripheral edema  ABDOMEN: soft, nontender, no hepatosplenomegaly, no masses and bowel sounds normal  MS: no gross musculoskeletal defects noted, no edema  SKIN: no suspicious lesions or rashes

## 2024-07-15 NOTE — TELEPHONE ENCOUNTER
General Call    Contacts       Contact Date/Time Type Contact Phone/Fax    07/15/2024 06:19 PM CDT Phone (Incoming) Jayshree Hastings (Self) [phone] (M)          Reason for Call:  Questions    What are your questions or concerns:  Patient called in wanting to know why she was prescribed a medication today.    Date of last appointment with provider: 7/15/24    Could we send this information to you in [place] or would you prefer to receive a phone call?:   Patient would prefer a phone call   Okay to leave a detailed message?: Yes at Cell number on file:    Telephone Information:   Mobile [phone]

## 2024-07-15 NOTE — PATIENT INSTRUCTIONS
Results for orders placed or performed in visit on 07/15/24   XR Chest 2 Views     Status: None    Narrative    XR CHEST 2 VIEWS 7/15/2024 3:27 PM    HISTORY: Infection due to 2019 novel coronavirus; Acute cough    COMPARISON: 8/11/2023      Impression    IMPRESSION: Mild patchy opacity in the left lung base may be due to  atelectasis or infection/inflammation. No pleural effusion or  pneumothorax. Normal heart size. Tortuous aortic arch.    JESSICA MANDEL MD         SYSTEM ID:  [id number]       CXR with opacity in left base per radiology.    Zpak sent.  Once daily for 5 days.    covid  swab positive 1 week ago.  .    Push fluids  Lots of handwashing.   Ibuprofen as needed for fever or pain  Delsym or dayquil/nyquil for cough as needed     Rest as able.   Will call if any other labs positive.    F/u in the clinic if symptoms persist or worsen.

## 2025-01-21 ENCOUNTER — HOSPITAL ENCOUNTER (OUTPATIENT)
Dept: MAMMOGRAPHY | Facility: CLINIC | Age: 75
Discharge: HOME | End: 2025-01-21
Attending: PHYSICIAN ASSISTANT
Payer: COMMERCIAL

## 2025-01-21 DIAGNOSIS — Z12.31 VISIT FOR SCREENING MAMMOGRAM: ICD-10-CM

## 2025-01-21 PROCEDURE — 77067 SCR MAMMO BI INCL CAD: CPT

## 2025-01-21 PROCEDURE — 77063 BREAST TOMOSYNTHESIS BI: CPT

## 2025-06-14 ENCOUNTER — HEALTH MAINTENANCE LETTER (OUTPATIENT)
Age: 75
End: 2025-06-14

## (undated) DEVICE — DRSG ABDOMINAL 07 1/2X8" 7197D

## (undated) DEVICE — ESU GROUND PAD UNIVERSAL W/O CORD

## (undated) DEVICE — SU VICRYL 0 CTX 36" J370H

## (undated) DEVICE — SOL NACL 0.9% IRRIG 1000ML BOTTLE 07138-09

## (undated) DEVICE — MANIFOLD NEPTUNE 4 PORT 700-20

## (undated) DEVICE — SU WND CLOSURE VLOC 180 ABS 0 24" GS-25 VLOCL0436

## (undated) DEVICE — PREP CHLORAPREP 26ML TINTED ORANGE  260815

## (undated) DEVICE — SYR 20ML SLIP TIP W/O NDL 302831

## (undated) DEVICE — GLOVE PROTEXIS BLUE W/NEU-THERA 8.5  2D73EB85

## (undated) DEVICE — ESU ELEC BLADE 6" COATED E1450-6

## (undated) DEVICE — SU FIBERWIRE 5 CCS-1 BLUE  AR-7211

## (undated) DEVICE — GLOVE PROTEXIS POWDER FREE 8.0 ORTHOPEDIC 2D73ET80

## (undated) DEVICE — IMM PILLOW ABDUCT HIP MED 31143061

## (undated) DEVICE — PACK TOTAL HIP W/U DRAPE SOP15HUFSC

## (undated) DEVICE — LINEN TOWEL PACK X5 5464

## (undated) DEVICE — GLOVE PROTEXIS W/NEU-THERA 8.0  2D73TE80

## (undated) DEVICE — SU ETHIBOND 0 CTX CR  8X18" CX31D

## (undated) DEVICE — DRSG ADAPTIC 3X8" 6113

## (undated) DEVICE — SYR 10ML FINGER CONTROL W/O NDL 309695

## (undated) DEVICE — GLOVE PROTEXIS W/NEU-THERA 8.5  2D73TE85

## (undated) DEVICE — CLIP APPLIER 11.5" PREMIUM II 134053

## (undated) DEVICE — SU VICRYL 2-0 CP-1 27" UND J266H

## (undated) DEVICE — NDL 18GA 1.5" 305196

## (undated) DEVICE — DRSG GAUZE 4X4" 3033

## (undated) DEVICE — SOL WATER IRRIG 1000ML BOTTLE 2F7114

## (undated) DEVICE — BLADE SAW SAGITTAL STRK 19.5X95X1.27MM 2108-109-000S15

## (undated) DEVICE — PREP SKIN SCRUB TRAY 4461A

## (undated) DEVICE — GLOVE PROTEXIS POWDER FREE 8.5 ORTHOPEDIC 2D73ET85

## (undated) RX ORDER — FENTANYL CITRATE 50 UG/ML
INJECTION, SOLUTION INTRAMUSCULAR; INTRAVENOUS
Status: DISPENSED
Start: 2017-09-20

## (undated) RX ORDER — LABETALOL HYDROCHLORIDE 5 MG/ML
INJECTION, SOLUTION INTRAVENOUS
Status: DISPENSED
Start: 2017-09-20

## (undated) RX ORDER — HYDRALAZINE HYDROCHLORIDE 20 MG/ML
INJECTION INTRAMUSCULAR; INTRAVENOUS
Status: DISPENSED
Start: 2017-09-20

## (undated) RX ORDER — PROPOFOL 10 MG/ML
INJECTION, EMULSION INTRAVENOUS
Status: DISPENSED
Start: 2017-09-20

## (undated) RX ORDER — FENTANYL CITRATE 50 UG/ML
INJECTION, SOLUTION INTRAMUSCULAR; INTRAVENOUS
Status: DISPENSED
Start: 2019-07-02

## (undated) RX ORDER — OXYCODONE HCL 10 MG/1
TABLET, FILM COATED, EXTENDED RELEASE ORAL
Status: DISPENSED
Start: 2017-09-20

## (undated) RX ORDER — HYDROMORPHONE HYDROCHLORIDE 1 MG/ML
INJECTION, SOLUTION INTRAMUSCULAR; INTRAVENOUS; SUBCUTANEOUS
Status: DISPENSED
Start: 2017-09-20

## (undated) RX ORDER — LIDOCAINE HYDROCHLORIDE 20 MG/ML
INJECTION, SOLUTION EPIDURAL; INFILTRATION; INTRACAUDAL; PERINEURAL
Status: DISPENSED
Start: 2017-09-20

## (undated) RX ORDER — ONDANSETRON 2 MG/ML
INJECTION INTRAMUSCULAR; INTRAVENOUS
Status: DISPENSED
Start: 2017-09-20

## (undated) RX ORDER — ONDANSETRON 2 MG/ML
INJECTION INTRAMUSCULAR; INTRAVENOUS
Status: DISPENSED
Start: 2019-07-02

## (undated) RX ORDER — VANCOMYCIN HYDROCHLORIDE 1 G/200ML
INJECTION, SOLUTION INTRAVENOUS
Status: DISPENSED
Start: 2017-09-20